# Patient Record
Sex: MALE | Race: WHITE | Employment: OTHER | URBAN - METROPOLITAN AREA
[De-identification: names, ages, dates, MRNs, and addresses within clinical notes are randomized per-mention and may not be internally consistent; named-entity substitution may affect disease eponyms.]

---

## 2017-01-25 DIAGNOSIS — Z95.1 S/P CABG X 4: ICD-10-CM

## 2017-01-25 DIAGNOSIS — I10 ESSENTIAL HYPERTENSION: ICD-10-CM

## 2017-01-25 DIAGNOSIS — I25.111 CORONARY ARTERY DISEASE INVOLVING NATIVE CORONARY ARTERY OF NATIVE HEART WITH ANGINA PECTORIS WITH DOCUMENTED SPASM (HCC): ICD-10-CM

## 2017-01-25 DIAGNOSIS — I25.83 CORONARY ARTERY DISEASE DUE TO LIPID RICH PLAQUE: Primary | ICD-10-CM

## 2017-01-25 DIAGNOSIS — I25.10 CORONARY ARTERY DISEASE DUE TO LIPID RICH PLAQUE: Primary | ICD-10-CM

## 2017-01-25 NOTE — TELEPHONE ENCOUNTER
----- Message from Tai Hernadez NP sent at 1/25/2017 12:22 PM EST -----  Queta Duran: Please call patient LDL is improved however our ideal goal is 70 or less currently on Lipitor 20 mg with his heart history I recommend increasing the Lipitor 40 mg.  Recheck CK CMP lipids in 3 months thank you

## 2017-01-25 NOTE — TELEPHONE ENCOUNTER
Verified patient with two identifiers. Spoke with patient regarding LAB results. Will mail lab slip to pt to have labs redrawn in 3 months. Increase lipitor to 40 mg QHS.

## 2017-01-26 RX ORDER — ATORVASTATIN CALCIUM 20 MG/1
40 TABLET, FILM COATED ORAL EVERY EVENING
Qty: 90 TAB | Refills: 3 | Status: SHIPPED | OUTPATIENT
Start: 2017-01-26 | End: 2017-01-31 | Stop reason: ALTCHOICE

## 2017-01-31 ENCOUNTER — TELEPHONE (OUTPATIENT)
Dept: CARDIOLOGY CLINIC | Age: 72
End: 2017-01-31

## 2017-01-31 RX ORDER — ATORVASTATIN CALCIUM 40 MG/1
TABLET, FILM COATED ORAL DAILY
COMMUNITY
End: 2017-02-22 | Stop reason: SDUPTHER

## 2017-01-31 NOTE — TELEPHONE ENCOUNTER
Please call patient as he rec'd a phone call from [employer] stating that his request was denied. Please call him about the next step .

## 2017-01-31 NOTE — TELEPHONE ENCOUNTER
Per patient, Dr. Abner Naranjo increased Lipitor to 40mg having patient take 2 pills. He received a letter from insurance saying 2 a day is over the limit. Please call to further discuss. Thanks!

## 2017-01-31 NOTE — TELEPHONE ENCOUNTER
Verified patient with two identifiers. Spoke with pt, pt refilled Lipitor, script was for 20 mg 1 tab 2x a day. Pt received letter from [facility], will not pay for med as it exceeds dosage. Called [employer] @ [phone] intimating a PA. Awaiting response.

## 2017-01-31 NOTE — TELEPHONE ENCOUNTER
Verified patient with two identifiers. Received fax from [address] 20 mg lipitor 2 tabs daily. Spoke with pt letting him know to call pharmacy to see if they can take med back, should not have filled script at 20 2 tabs daily should have been 40 mg 1 tab daily.

## 2017-02-22 ENCOUNTER — OFFICE VISIT (OUTPATIENT)
Dept: CARDIOLOGY CLINIC | Age: 72
End: 2017-02-22

## 2017-02-22 VITALS
DIASTOLIC BLOOD PRESSURE: 80 MMHG | BODY MASS INDEX: 21.86 KG/M2 | OXYGEN SATURATION: 99 % | WEIGHT: 148 LBS | SYSTOLIC BLOOD PRESSURE: 130 MMHG | HEART RATE: 59 BPM

## 2017-02-22 DIAGNOSIS — I25.10 ASHD (ARTERIOSCLEROTIC HEART DISEASE): Primary | ICD-10-CM

## 2017-02-22 DIAGNOSIS — E78.00 HIGH CHOLESTEROL: ICD-10-CM

## 2017-02-22 DIAGNOSIS — Z95.1 S/P CABG X 4: ICD-10-CM

## 2017-02-22 DIAGNOSIS — E78.2 MIXED HYPERLIPIDEMIA: ICD-10-CM

## 2017-02-22 RX ORDER — ATORVASTATIN CALCIUM 40 MG/1
40 TABLET, FILM COATED ORAL DAILY
Qty: 30 TAB | Refills: 6 | Status: SHIPPED | OUTPATIENT
Start: 2017-02-22 | End: 2017-10-18 | Stop reason: SDUPTHER

## 2017-02-22 NOTE — PROGRESS NOTES
NAME:  Leonel Jackson   :   1945   MRN:   [id number]   PCP:  Gwyn Lovett MD           Subjective: The patient is a 73y.o. year old male  who returns for a routine follow-up. Since the last visit, patient reports no change in exercise tolerance, chest pain, edema, medication intolerance, palpitations, shortness of breath, PND/orthopnea wheezing, sputum, syncope, dizziness or light headedness. Doing well. 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 ASHD (arteriosclerotic heart disease) I25.10 414.00    2. High cholesterol E78.00 272.0 AMB POC EKG ROUTINE W/ 12 LEADS, INTER & REP   3. S/P CABG x 4 Z95.1 V45.81    4. Mixed hyperlipidemia E78.2 272.2       Social History   Substance Use Topics    Smoking status: Former Smoker     Years: 20.00     Quit date: 2008    Smokeless tobacco: Never Used    Alcohol use Yes      Comment: \"every once and a while\"      Family History   Problem Relation Age of Onset    Heart Disease Father     Heart Disease Maternal Grandfather     Cancer Mother 80     lung-nonsmoker    Heart Disease Brother         Review of Systems  General: Pt denies excessive weight gain or loss. Pt is able to conduct ADL's  HEENT: Denies blurred vision, headaches, epistaxis and difficulty swallowing. Respiratory: Denies shortness of breath, CONDON, wheezing or stridor. Cardiovascular: Denies precordial pain, palpitations, edema or PND  Gastrointestinal: Denies poor appetite, indigestion, abdominal pain or blood in stool  Musculoskeletal: Denies pain or swelling from muscles or joints  Neurologic: Denies tremor, paresthesias, or sensory motor disturbance  Skin: Denies rash, itching or texture change.     Objective:       Vitals:    17 0939   BP: 130/80 Pulse: (!) 59   SpO2: 99%   Weight: 148 lb (67.1 kg)    Body mass index is 21.86 kg/(m^2). General PE  Mental Status - Alert. General Appearance - Not in acute distress. Chest and Lung Exam   Inspection: Accessory muscles - No use of accessory muscles in breathing. Auscultation:   Breath sounds: - Normal.    Cardiovascular   Inspection: Jugular vein - Bilateral - Inspection Normal.  Palpation/Percussion:   Apical Impulse: - Normal.  Auscultation: Rhythm - Regular. Heart Sounds - S1 WNL and S2 WNL. No S3 or S4. Murmurs & Other Heart Sounds: Auscultation of the heart reveals - No Murmurs. Peripheral Vascular   Upper Extremity: Inspection - Bilateral - No Cyanotic nailbeds or Digital clubbing. Lower Extremity:   Palpation: Edema - Bilateral - No edema. Data Review:     EKG -EKG: normal EKG, normal sinus rhythm, unchanged from previous tracings. Medications reviewed  Current Outpatient Prescriptions   Medication Sig    atorvastatin (LIPITOR) 40 mg tablet Take  by mouth daily.  metoprolol tartrate (LOPRESSOR) 25 mg tablet Take 1 Tab by mouth two (2) times a day.  amLODIPine (NORVASC) 2.5 mg tablet Take 1 Tab by mouth daily for 180 doses.  aspirin 81 mg chewable tablet Take 1 Tab by mouth daily. No current facility-administered medications for this visit. Assessment:       ICD-10-CM ICD-9-CM    1. ASHD (arteriosclerotic heart disease) I25.10 414.00    2. High cholesterol E78.00 272.0 AMB POC EKG ROUTINE W/ 12 LEADS, INTER & REP   3. S/P CABG x 4 Z95.1 V45.81    4. Mixed hyperlipidemia E78.2 272.2         Plan:     Patient presents doing well and stable from cardiac standpoint. BP well controlled. Repeat lipids. Encouraged to continue exercise. Continue current care and follow up in 6 months.     Rosa M Jarrett MD

## 2017-02-22 NOTE — MR AVS SNAPSHOT
Visit Information Date & Time Provider Department Dept. Phone Encounter #  
 2/22/2017  9:45 AM Jennifer Anderson MD [facility] [phone] [id number] Your Appointments 4/4/2017  2:00 PM  
ROUTINE CARE with MD SAMIRA Bauer [facility]) Appt Note: 4 month follow up-do not split appt MW; pt r/s from 3/6/17 [address] [facility] [phone]  
[address] [facility] [address] [place] [address] Upcoming Health Maintenance Date Due Hepatitis C Screening 1945 DTaP/Tdap/Td series (1 - Tdap) 11/12/1968 FOBT Q 1 YEAR AGE 50-75 11/12/1997 ZOSTER VACCINE AGE 60> 11/12/2007 GLAUCOMA SCREENING Q2Y 11/12/2012 Pneumococcal 65+ Low/Medium Risk (1 of 2 - PCV13) 11/12/2012 MEDICARE YEARLY EXAM 11/12/2012 INFLUENZA AGE 9 TO ADULT 8/1/2016 Allergies as of 2/22/2017  Review Complete On: 2/22/2017 By: Veronica Zimmerman RN Severity Noted Reaction Type Reactions Tramadol High 09/23/2016   Systemic   
 hallucinations and syncope necessitating ER trip Tramadol High 09/26/2016   Side Effect Other (comments)  
 fainted Current Immunizations  Never Reviewed No immunizations on file. Not reviewed this visit You Were Diagnosed With   
  
 Codes Comments ASHD (arteriosclerotic heart disease)    -  Primary ICD-10-CM: I25.10 ICD-9-CM: 414.00 High cholesterol     ICD-10-CM: E78.00 ICD-9-CM: 272.0 S/P CABG x 4     ICD-10-CM: Z95.1 ICD-9-CM: V45.81 Mixed hyperlipidemia     ICD-10-CM: E78.2 ICD-9-CM: 272.2 Vitals BP  
  
  
  
  
  
 130/80 (BP 1 Location: Right arm, BP Patient Position: Sitting) Vitals History BMI and BSA Data Body Mass Index Body Surface Area  
 21.86 kg/m 2 1.81 m 2 Preferred Pharmacy Pharmacy Name Phone [id number]- [place], [address] [phone] Your Updated Medication List  
  
   
This list is accurate as of: 2/22/17 10:06 AM.  Always use your most recent med list. amLODIPine 2.5 mg tablet Commonly known as:  Adali Colon Take 1 Tab by mouth daily for 180 doses. aspirin 81 mg chewable tablet Take 1 Tab by mouth daily. atorvastatin 40 mg tablet Commonly known as:  LIPITOR Take  by mouth daily. metoprolol tartrate 25 mg tablet Commonly known as:  LOPRESSOR Take 1 Tab by mouth two (2) times a day. We Performed the Following AMB POC EKG ROUTINE W/ 12 LEADS, INTER & REP [62228 CPT(R)] Introducing [facility]! Aure Almon[employer] introduces [employer] patient portal. Now you can access parts of your medical record, email your doctor's office, and request medication refills online. 1. In your internet browser, go to [email or web address] 2. Click on the First Time User? Click Here link in the Sign In box. You will see the New Member Sign Up page. 3. Enter your [employer] Access Code exactly as it appears below. You will not need to use this code after youve completed the sign-up process. If you do not sign up before the expiration date, you must request a new code. · [employer] Access Code: [email or web address] Expires: 5/23/2017  9:34 AM 
 
4. Enter the last four digits of your Social Security Number (xxxx) and Date of Birth (mm/dd/yyyy) as indicated and click Submit. You will be taken to the next sign-up page. 5. Create a [employer] ID. This will be your [employer] login ID and cannot be changed, so think of one that is secure and easy to remember. 6. Create a [employer] password. You can change your password at any time. 7. Enter your Password Reset Question and Answer. This can be used at a later time if you forget your password. 8. Enter your e-mail address. You will receive e-mail notification when new information is available in [address] 9. Click Sign Up. You can now view and download portions of your medical record. 10. Click the Download Summary menu link to download a portable copy of your medical information. If you have questions, please visit the Frequently Asked Questions section of the [employer] website. Remember, [employer] is NOT to be used for urgent needs. For medical emergencies, dial 911. Now available from your iPhone and Android! Please provide this summary of care documentation to your next provider. Your primary care clinician is listed as Jagdish Whelan. If you have any questions after today's visit, please call [phone].

## 2017-04-19 RX ORDER — METOPROLOL TARTRATE 25 MG/1
25 TABLET, FILM COATED ORAL 2 TIMES DAILY
Qty: 60 TAB | Refills: 3 | Status: SHIPPED | OUTPATIENT
Start: 2017-04-19 | End: 2017-07-16 | Stop reason: SDUPTHER

## 2017-04-22 LAB
BUN SERPL-MCNC: 20 MG/DL (ref 8–27)
BUN/CREAT SERPL: 18 (ref 10–24)
CALCIUM SERPL-MCNC: 9.3 MG/DL (ref 8.6–10.2)
CHLORIDE SERPL-SCNC: 105 MMOL/L (ref 96–106)
CHOLEST SERPL-MCNC: 135 MG/DL (ref 100–199)
CK SERPL-CCNC: 72 U/L (ref 24–204)
CO2 SERPL-SCNC: 24 MMOL/L (ref 18–29)
CREAT SERPL-MCNC: 1.11 MG/DL (ref 0.76–1.27)
GLUCOSE SERPL-MCNC: 93 MG/DL (ref 65–99)
HDLC SERPL-MCNC: 60 MG/DL
INTERPRETATION, 910389: NORMAL
LDLC SERPL CALC-MCNC: 66 MG/DL (ref 0–99)
POTASSIUM SERPL-SCNC: 5.6 MMOL/L (ref 3.5–5.2)
SODIUM SERPL-SCNC: 143 MMOL/L (ref 134–144)
TRIGL SERPL-MCNC: 46 MG/DL (ref 0–149)
VLDLC SERPL CALC-MCNC: 9 MG/DL (ref 5–40)

## 2017-04-25 DIAGNOSIS — I25.10 CORONARY ARTERY DISEASE DUE TO LIPID RICH PLAQUE: ICD-10-CM

## 2017-04-25 DIAGNOSIS — Z95.1 S/P CABG X 4: ICD-10-CM

## 2017-04-25 DIAGNOSIS — I25.111 CORONARY ARTERY DISEASE INVOLVING NATIVE CORONARY ARTERY OF NATIVE HEART WITH ANGINA PECTORIS WITH DOCUMENTED SPASM (HCC): ICD-10-CM

## 2017-04-25 DIAGNOSIS — I10 ESSENTIAL HYPERTENSION: ICD-10-CM

## 2017-04-25 DIAGNOSIS — I25.83 CORONARY ARTERY DISEASE DUE TO LIPID RICH PLAQUE: ICD-10-CM

## 2017-06-01 ENCOUNTER — TELEPHONE (OUTPATIENT)
Dept: CARDIOLOGY CLINIC | Age: 72
End: 2017-06-01

## 2017-06-01 DIAGNOSIS — I25.83 CORONARY ARTERY DISEASE DUE TO LIPID RICH PLAQUE: Primary | ICD-10-CM

## 2017-06-01 DIAGNOSIS — I25.10 CORONARY ARTERY DISEASE DUE TO LIPID RICH PLAQUE: Primary | ICD-10-CM

## 2017-06-01 DIAGNOSIS — I25.10 ASHD (ARTERIOSCLEROTIC HEART DISEASE): ICD-10-CM

## 2017-06-08 LAB
BUN SERPL-MCNC: 15 MG/DL (ref 8–27)
BUN/CREAT SERPL: 14 (ref 10–24)
CALCIUM SERPL-MCNC: 9.4 MG/DL (ref 8.6–10.2)
CHLORIDE SERPL-SCNC: 103 MMOL/L (ref 96–106)
CO2 SERPL-SCNC: 22 MMOL/L (ref 18–29)
CREAT SERPL-MCNC: 1.07 MG/DL (ref 0.76–1.27)
GLUCOSE SERPL-MCNC: 96 MG/DL (ref 65–99)
POTASSIUM SERPL-SCNC: 5.6 MMOL/L (ref 3.5–5.2)
SODIUM SERPL-SCNC: 142 MMOL/L (ref 134–144)

## 2017-06-21 ENCOUNTER — OFFICE VISIT (OUTPATIENT)
Dept: INTERNAL MEDICINE CLINIC | Age: 72
End: 2017-06-21

## 2017-06-21 ENCOUNTER — TELEPHONE (OUTPATIENT)
Dept: CARDIOLOGY CLINIC | Age: 72
End: 2017-06-21

## 2017-06-21 VITALS
HEART RATE: 74 BPM | TEMPERATURE: 97.9 F | WEIGHT: 153 LBS | RESPIRATION RATE: 15 BRPM | DIASTOLIC BLOOD PRESSURE: 80 MMHG | OXYGEN SATURATION: 100 % | BODY MASS INDEX: 22.66 KG/M2 | SYSTOLIC BLOOD PRESSURE: 123 MMHG | HEIGHT: 69 IN

## 2017-06-21 DIAGNOSIS — Z00.00 ROUTINE GENERAL MEDICAL EXAMINATION AT A HEALTH CARE FACILITY: Primary | ICD-10-CM

## 2017-06-21 DIAGNOSIS — E78.00 HIGH CHOLESTEROL: ICD-10-CM

## 2017-06-21 DIAGNOSIS — E87.5 SERUM POTASSIUM ELEVATED: ICD-10-CM

## 2017-06-21 DIAGNOSIS — Z13.31 SCREENING FOR DEPRESSION: ICD-10-CM

## 2017-06-21 RX ORDER — ASCORBIC ACID 500 MG
500 TABLET ORAL 2 TIMES DAILY
COMMUNITY

## 2017-06-21 NOTE — TELEPHONE ENCOUNTER
Patient states she needs a call back in reference to his shingles Vaccination not being covered by his insurance & needs to discuss options.  Thank you

## 2017-06-21 NOTE — TELEPHONE ENCOUNTER
Medicare Complete does not cover  Co- pay $210 patient can't  Afford this wants to know  Do you know of any other options

## 2017-06-21 NOTE — PROGRESS NOTES
1. Have you been to the ER, urgent care clinic since your last visit? Hospitalized since your last visit? No    2. Have you seen or consulted any other health care providers outside of the [address] [place] [address] since your last visit? Include any pap smears or colon screening.  No

## 2017-06-21 NOTE — PROGRESS NOTES
Dr. Nola Lake referred SCARLETT, 1945, a 73 y.o. male for a Medicare Annual Wellness Visit (AWV). This is an Initial Medicare Annual Wellness Visit providing Personalized Prevention Plan Services (PPPS) (Performed 12 months after initial AWV and PPPS )    I have reviewed the patient's medical history in detail and updated the computerized patient record. 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  metoprolol tartrate (LOPRESSOR) 25 mg tablet Take 1 Tab by mouth two (2) times a day. 60 Tab 3    atorvastatin (LIPITOR) 40 mg tablet Take 1 Tab by mouth daily. Indications: Please give patient 40 mg tablets, insurance will not pay for 20mg twice daily 30 Tab 6    aspirin 81 mg chewable tablet Take 1 Tab by mouth daily.  30 Tab 11     Allergies   Allergen Reactions    Tramadol      hallucinations and syncope necessitating ER trip    Tramadol Other (comments)     fainted     Family History   Problem Relation Age of Onset    Heart Disease Father     Heart Disease Maternal Grandfather     Cancer Mother      Lung cacer - non-smoker    Heart Disease Brother     Other Son      Back issues    Anxiety Daughter      Social History   Substance Use Topics    Smoking status: Former Smoker     Years: 20.00     Quit date: 1/1/2008    Smokeless tobacco: Never Used    Alcohol use Yes      Comment: \"every once and a while\"     Patient Active Problem List   Diagnosis Code    Syncope R55    Angina, class III ([facility] [address] I20.9    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 No    Do you average more than 14 drinks per week? No        Functional Ability and Level of Safety:     Hearing Loss   None noted by patient. Activities of Daily Living   Self-care. activities of daily living  ADL Assessment 6/21/2017   Feeding yourself No Help Needed   Getting from bed to chair No Help Needed   Getting dressed No Help Needed   Bathing or showering No Help Needed   Walk across the room (includes cane/walker) No Help Needed   Using the telphone No Help Needed   Taking your medications No Help Needed   Preparing meals No Help Needed   Managing money (expenses/bills) No Help Needed   Moderately strenuous housework (laundry) No Help Needed   Shopping for personal items (toiletries/medicines) No Help Needed   Shopping for groceries No Help Needed   Driving No Help Needed   Climbing a flight of stairs No Help Needed   Getting to places beyond walking distances No Help Needed     Fall Risk   Fall Risk Assessment, last 12 mths 6/21/2017   Able to walk? Yes   Fall in past 12 months? No     Abuse Screen   None  Patient is not abused   Abuse Screening Questionnaire 6/21/2017   Do you ever feel afraid of your partner?  N   Are you in a relationship with someone who physically or mentally threatens you? N   Is it safe for you to go home? Y         Review of Systems   Not required    Physical Examination     Evaluation of Cognitive Function:  Mood/affect:  neutral  Appearance: age appropriate  Family member/caregiver input: none    No exam performed today. Patient Care Team:  Ashlee Webb MD as PCP - General (Family Practice)  [address], MD as Referring Provider (Cardiology)  Kemar Jameson MD as Surgeon (Cardiothoracic Surgery)    Advice/Referrals/Counseling   Education and counseling provided:  Pneumococcal Vaccine, Shingles vaccine      Assessment/Plan       1. Tachycardia  · HR was 45 bpm on visit. HR from last visit with Dr. Marcus Saavedra was 59 bpm back in 2/2017 and 52 in 11/2016. · Patient is being seen by Dr. Marcus Saavedra (cardiologist) - will follow-up   2. Hyperkalemia  · No medication(s) noted that could potentially cause hyperkalemia  · No significant change in renal function. SCr range from 1.9 to 1.11 within the past 6 months  · Recheck serum potassium  · Follow-up if needed  3. Vaccinations  · Encouraged patient to get shingles vaccine from local pharmacy  · Encouraged patient to get Prevnar-13 followed by Pneumovax-23 one year later from local pharmacy  · VIS for both vaccinations given to patient  4. Colonoscopy  · Patient is not interested at this time  5. Glaucoma Exam  · Advised patient to bring in copy of paperwork      Patient verbalized understanding of information presented. Answered all of the patient's questions. AVS handed and reviewed with patient. Thanks,  Kimberly Torres, PharmD  PGY-1 Pharmacy Resident      I have reviewed the information regarding the Medicare Annual Well Visit as documented by my nurse navigator; Agree with assessment.  Ashlee Webb MD

## 2017-06-21 NOTE — PROGRESS NOTES
SUBJECTIVE:   Mr. Lizzeth Chapman is a 73 y.o. male who is here for follow up of hld. Pt's BP is well controlled 123/80 today. Pt states he has not seen Dr. Malorie Guallpa (cardiology) since 2/22/2017. Pt states he had lab work done for Dr. Malorie Guallpa 3 weeks ago, noting his potassium was elevated at 5.6. Pt's labs from January 2017 were reviewed noting his potassium level was 5.6 then. Pt denies taking any additional vitamins or supplements. Pt states he will f/u with cardiology in August.    Pt states his energy level has been good, but notes he has not been walking as much recently due to the heat. Pt states he used to not be able to rake the yard without taking a break due to CP, but notes he no longer experiences CP while doing yard work. Routine Health Maintenance:  Zostavax: pt states he will get this at [facility]  Colonoscopy: pt states he will pursue colonoscopy once heart concerns are stable    At this time, he is otherwise doing well and has brought no other complaints to my attention today. For a list of the medical issues addressed today, see the assessment and plan below. 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 All: is allergic to tramadol and tramadol. MEDS:   Current Outpatient Prescriptions   Medication Sig    ascorbic acid, vitamin C, (VITAMIN C) 500 mg tablet Take 1,000 mg by mouth two (2) times a day.  metoprolol tartrate (LOPRESSOR) 25 mg tablet Take 1 Tab by mouth two (2) times a day.  atorvastatin (LIPITOR) 40 mg tablet Take 1 Tab by mouth daily.  Indications: Please give patient 40 mg tablets, insurance will not pay for 20mg twice daily    aspirin 81 mg chewable tablet Take 1 Tab by mouth daily. No current facility-administered medications for this visit. FH: family history includes Anxiety in his daughter; Cancer in his mother; Heart Disease in his brother, father, and maternal grandfather; Other in his son. SH:  reports that he quit smoking about 9 years ago. He quit after 20.00 years of use. He has never used smokeless tobacco. He reports that he drinks alcohol. He reports that he does not use illicit drugs. Review of Systems - History obtained from the patient  General ROS: negative  Psychological ROS: negative  Ophthalmic ROS: negative  ENT ROS: negative  Respiratory ROS: no cough, shortness of breath, or wheezing  Cardiovascular ROS: no chest pain or dyspnea on exertion  Gastrointestinal ROS: no abdominal pain, change in bowel habits, or black or bloody stools  Genito-Urinary ROS: negative  Musculoskeletal ROS: negative  Neurological ROS: negative  Dermatological ROS: negative    OBJECTIVE:   Vitals:   Visit Vitals    /80    Pulse (!) 45    Temp 97.9 °F (36.6 °C) (Oral)    Resp 15    Ht 5' 9\" (1.753 m)    Wt 153 lb (69.4 kg)    SpO2 100%    BMI 22.59 kg/m2      Gen: Pleasant 73 y.o.  male in NAD. HEENT: NC/AT. HEART: RRR, No M/G/R. LUNGS: CTAB No W/R. EXTREMITIES: Warm. No C/C/E. NEURO: Alert and oriented x 3. Cranial nerves grossly intact. No focal sensory or motor deficits noted. SKIN: Warm. Dry. No rashes or other lesions noted. ASSESSMENT/ PLAN:   Siri Cutler was seen today for other and annual wellness visit. Diagnoses and all orders for this visit:    High cholesterol    Serum potassium elevated  -     Cancel: METABOLIC PANEL, BASIC  -     METABOLIC PANEL, BASIC      I advised pt that his Potassium level has been stable at 5.6 since January 2017. Pt and I discussed that as long as his potassium level is not elevating, he should not be concerned. I ordered a CMP to for continued monitoring of pt's potassium level.     Pt will f/u in 6 months for hld.    Follow-up Disposition:  Return in about 6 months (around 12/21/2017) for follow up hld. I have reviewed the patient's medications and risks/side effects/benefits were discussed. Diagnosis(-es) explained to patient and questions answered. Literature provided where appropriate.      Written by Raysa Mari, as dictated by Shahid Lauren MD.

## 2017-06-21 NOTE — MR AVS SNAPSHOT
Visit Information Date & Time Provider Department Dept. Phone Encounter #  
 6/21/2017  1:30 PM Rosalind Marshall, [address] [place] [address],[address] [phone] [id number] Follow-up Instructions Return in about 6 months (around 12/21/2017) for follow up hld. Your Appointments 8/15/2017 11:00 AM  
6 MONTH with Michelet Celeste MD  
[facility] [address] [facility]) Appt Note: 6 month per Dr. Dee Poster, $40.00 cc, jr 2/22/17,  
 [id number] [facility]  
[phone] [id number] [facility] Upcoming Health Maintenance Date Due Hepatitis C Screening 1945 DTaP/Tdap/Td series (1 - Tdap) 11/12/1968 FOBT Q 1 YEAR AGE 50-75 11/12/1997 ZOSTER VACCINE AGE 60> 11/12/2007 GLAUCOMA SCREENING Q2Y 11/12/2012 Pneumococcal 65+ Low/Medium Risk (1 of 2 - PCV13) 11/12/2012 INFLUENZA AGE 9 TO ADULT 8/1/2017 MEDICARE YEARLY EXAM 6/22/2018 Allergies as of 6/21/2017  Review Complete On: 6/21/2017 By: Rosalind Marshall MD  
  
 Severity Noted Reaction Type Reactions Tramadol High 09/23/2016   Systemic   
 hallucinations and syncope necessitating ER trip Tramadol High 09/26/2016   Side Effect Other (comments)  
 fainted Current Immunizations  Never Reviewed No immunizations on file. Not reviewed this visit You Were Diagnosed With   
  
 Codes Comments High cholesterol    -  Primary ICD-10-CM: E78.00 ICD-9-CM: 272.0 Serum potassium elevated     ICD-10-CM: E87.5 ICD-9-CM: 276.7 Screening for depression     ICD-10-CM: Z13.89 ICD-9-CM: V79.0 Vitals BP Pulse Temp Resp Height(growth percentile) Weight(growth percentile) 123/80 (!) 45 97.9 °F (36.6 °C) (Oral) 15 5' 9\" (1.753 m) 153 lb (69.4 kg) SpO2 BMI Smoking Status 100% 22.59 kg/m2 Former Smoker BMI and BSA Data  Body Mass Index Body Surface Area  
 22.59 kg/m 2 1.84 m 2  
  
  
 Preferred Pharmacy Pharmacy Name Phone [id number] [address] [phone] Your Updated Medication List  
  
   
This list is accurate as of: 6/21/17  2:53 PM.  Always use your most recent med list.  
  
  
  
  
 ascorbic acid (vitamin C) 500 mg tablet Commonly known as:  VITAMIN C Take 1,000 mg by mouth two (2) times a day. aspirin 81 mg chewable tablet Take 1 Tab by mouth daily. atorvastatin 40 mg tablet Commonly known as:  LIPITOR Take 1 Tab by mouth daily. Indications: Please give patient 40 mg tablets, insurance will not pay for 20mg twice daily  
  
 metoprolol tartrate 25 mg tablet Commonly known as:  LOPRESSOR Take 1 Tab by mouth two (2) times a day. We Performed the Following [place] [address] [[id number] [facility]] METABOLIC PANEL, BASIC [64386 CPT(R)] Follow-up Instructions Return in about 6 months (around 12/21/2017) for follow up hld. Patient Instructions Medicare Part B Preventive Services Limitations Recommendation Scheduled Bone Mass Measurement 
(age 72 & older, biennial) Requires diagnosis related to osteoporosis or estrogen deficiency. Biennial benefit unless patient has history of long-term glucocorticoid tx or baseline is needed because initial test was by other method NA NA Cardiovascular Screening Blood Tests (every 5 years) Total cholesterol, HDL, Triglycerides Order as a panel if possible Last: 4/21/17 Every Year Next: 4/18 Colorectal Cancer Screening 
-Fecal occult blood test (annual) -Flexible sigmoidoscopy (5y) 
-Screening colonoscopy (10y) -Barium Enema  Every 5-10 years depending on your colonoscopy result Schedule an appointment Counseling to Prevent Tobacco Use (up to 8 sessions per year) - Counseling greater than 3 and up to 10 minutes - Counseling greater than 10 minutes Patients must be asymptomatic of tobacco-related conditions to receive as preventive service Last: Next:  
Diabetes Screening Tests (at least every 3 years, Medicare covers annually or at 6-month intervals for prediabetic patients) Fasting blood sugar (FBS) or glucose tolerance test (GTT) Patient must be diagnosed with one of the following: 
-Hypertension, Dyslipidemia, obesity, previous impaired FBS or GTT 
Or any two of the following: overweight, FH of diabetes, age ? 72, history of gestational diabetes, birth of baby weighing more than 9 pounds Last: 17 B mg/dL Every 3-6 months depending on your result Every 3 years Next:   Diabetes Self-Management Training (DSMT) (no USPSTF recommendation) Requires referral by treating physician for patient with diabetes or renal disease. 10 hours of initial DSMT session of no less than 30 minutes each in a continuous 12-month period. 2 hours of follow-up DSMT in subsequent years. Last: NA Next: NA  
Glaucoma Screening (no USPSTF recommendation) Diabetes mellitus, family history, , age 48 or over,  American, age 72 or over Every year Please provide a copy of your visit to Dr. Wadie Dance. Can fax to the office. [phone] Human Immunodeficiency Virus (HIV) Screening (annually for increased risk patients) HIV-1 and HIV-2 by EIA, JAMIL, rapid antibody test, or oral mucosa transudate Patient must be at increased risk for HIV infection per USPSTF guidelines or pregnant. Tests covered annually for patients at increased risk. Pregnant patients may receive up to 3 test during pregnancy. Last: NA Next: NA Medical Nutrition Therapy (MNT) (for diabetes or renal disease not recommended schedule) Requires referral by treating physician for patient with diabetes or renal disease. Can be provided in same year as diabetes self-management training (DSMT), and CMS recommends medical nutrition therapy take place after DSMT.   Up to 3 hours for initial year and 2 hours in subsequent years. Last: NA Next: NA  
Prostate Cancer Screening (annually up to age 76) - Digital rectal exam (DAREN) - Prostate specific antigen (PSA) Annually (age 48 or over), DAREN not paid separately when covered E/M service is provided on same date Last:  Next:  Discuss with your doctor if this test is appropriate for you Seasonal Influenza Vaccination (annually)  Last: 11/7/16 Every Fall Please get one this Fall Shingles Vaccination A shingles vaccine is also recommended once in a lifetime after age 61   Consider shingles vaccine Pneumococcal Vaccination (once after 65)   Consider Prevnar-13 vaccine followed by Pneumovax-23 one year later Hepatitis B Vaccinations (if medium/high risk) Medium/high risk factors:  End-stage renal disease, Hemophiliacs who received Factor VIII or IX concentrates, Clients of institutions for the mentally retarded, Persons who live in the same house as a HepB virus carrier, Homosexual men, Illicit injectable drug abusers. Last: NA Next: NA Ultrasound Screening for Abdominal Aortic Aneurysm (AAA) (once) Patient must be referred through IPPE and not have had a screening for abdominal aortic aneurysm before under Medicare. Limited to patients who meet one of the following criteria: 
- Men who are 73-68 years old and have smoked more than 100 cigarettes in their lifetime. 
-Anyone with a FH of AAA 
-Anyone recommended for screening by USPSTF N/A N/A  
N/A:  Not applicable Vaccine Information Statement Pneumococcal Polysaccharide Vaccine: What You Need to Know Many Vaccine Information Statements are available in Yakut and other languages. See www.immunize.org/vis. Hojas de información Sobre Vacunas están disponibles en español y en muchos otros idiomas. Visite Bird.si. 1. Why get vaccinated? Vaccination can protect older adults (and some children and younger adults) from pneumococcal disease. Pneumococcal disease is caused by bacteria that can spread from person to person through close contact. It can cause ear infections, and it can also lead to more serious infections of the: 
 Lungs (pneumonia),  Blood (bacteremia), and 
 Covering of the brain and spinal cord (meningitis). Meningitis can cause deafness and brain damage, and it can be fatal.   
 
Anyone can get pneumococcal disease, but children under 3years of age, people with certain medical conditions, adults over 72years of age, and cigarette smokers are at the highest risk. About 18,000 older adults die each year from pneumococcal disease in the [place]. Treatment of pneumococcal infections with penicillin and other drugs used to be more effective. But some strains of the disease have become resistant to these drugs. This makes prevention of the disease, through vaccination, even more important. 2. Pneumococcal polysaccharide vaccine (PPSV23) Pneumococcal polysaccharide vaccine (PPSV23) protects against 23 types of pneumococcal bacteria. It will not prevent all pneumococcal disease. PPSV23 is recommended for:  All adults 72years of age and older,  Anyone 2 through 59years of age with certain long-term health problems, 
 Anyone 2 through 59years of age with a weakened immune system, 
 Adults 23 through 59years of age who smoke cigarettes or have asthma. Most people need only one dose of PPSV. A second dose is recommended for certain high-risk groups. People 72 and older should get a dose even if they have gotten one or more doses of the vaccine before they turned 65. Your healthcare provider can give you more information about these recommendations. Most healthy adults develop protection within 2 to 3 weeks of getting the shot. 3. Some people should not get this vaccine  Anyone who has had a life-threatening allergic reaction to PPSV should not get another dose.  Anyone who has a severe allergy to any component of PPSV should not receive it. Tell your provider if you have any severe allergies.  Anyone who is moderately or severely ill when the shot is scheduled may be asked to wait until they recover before getting the vaccine. Someone with a mild illness can usually be vaccinated.  Children less than 3years of age should not receive this vaccine.  There is no evidence that PPSV is harmful to either a pregnant woman or to her fetus. However, as a precaution, women who need the vaccine should be vaccinated before becoming pregnant, if possible. 4. Risks of a vaccine reaction With any medicine, including vaccines, there is a chance of side effects. These are usually mild and go away on their own, but serious reactions are also possible. About half of people who get PPSV have mild side effects, such as redness or pain where the shot is given, which go away within about two days. Less than 1 out of 100 people develop a fever, muscle aches, or more severe local reactions. Problems that could happen after any vaccine:  People sometimes faint after a medical procedure, including vaccination. Sitting or lying down for about 15 minutes can help prevent fainting, and injuries caused by a fall. Tell your doctor if you feel dizzy, or have vision changes or ringing in the ears.  Some people get severe pain in the shoulder and have difficulty moving the arm where a shot was given. This happens very rarely.  Any medication can cause a severe allergic reaction. Such reactions from a vaccine are very rare, estimated at about 1 in a million doses, and would happen within a few minutes to a few hours after the vaccination. As with any medicine, there is a very remote chance of a vaccine causing a serious injury or death. The safety of vaccines is always being monitored.   For more information, visit: www.cdc.gov/vaccinesafety/  
 
 5. What if there is a serious reaction? What should I look for? Look for anything that concerns you, such as signs of a severe allergic reaction, very high fever, or unusual behavior. Signs of a severe allergic reaction can include hives, swelling of the face and throat, difficulty breathing, a fast heartbeat, dizziness, and weakness. These would usually start a few minutes to a few hours after the vaccination. What should I do? If you think it is a severe allergic reaction or other emergency that cant wait, call 9-1-1 or get to the nearest hospital. Otherwise, call your doctor. Afterward, the reaction should be reported to the Vaccine Adverse Event Reporting System (VAERS). Your doctor might file this report, or you can do it yourself through the VAERS web site at www.vaers. [employer].gov, or by calling 1-854.946.4539. VAERS does not give medical advice. 6. How can I learn more?  Ask your doctor. He or she can give you the vaccine package insert or suggest other sources of information.  Call your local or state health department.  Contact the Centers for Disease Control and Prevention (CDC): 
- Call 2-633.769.1749 (1-800-CDC-INFO) or 
- Visit CDCs website at www.cdc.gov/vaccines Vaccine Information Statement PPSV  
04/24/2015 Department of [facility] and [employer] Centers for Disease Control and Prevention Office Use Only Vaccine Information Statement Pneumococcal Conjugate Vaccine (PCV13): What You Need to Know Many Vaccine Information Statements are available in Yi and other languages. See www.immunize.org/vis. Hojas de información Sobre Vacunas están disponibles en español y en muchos otros idiomas. Visite www.immunize.org/vis. 1. Why get vaccinated? Vaccination can protect both children and adults from pneumococcal disease.  
 
Pneumococcal disease is caused by bacteria that can spread from person to person through close contact. It can cause ear infections, and it can also lead to more serious infections of the: 
 Lungs (pneumonia),  Blood (bacteremia), and 
 Covering of the brain and spinal cord (meningitis). Pneumococcal pneumonia is most common among adults. Pneumococcal meningitis can cause deafness and brain damage, and it kills about 1 child in 10 who get it. Anyone can get pneumococcal disease, but children under 3years of age and adults 72 years and older, people with certain medical conditions, and cigarette smokers are at the highest risk. Before there was a vaccine, the [facility] saw: 
 more than 700 cases of meningitis, 
 about 13,000 blood infections, 
 about 5 million ear infections, and 
 about 200 deaths 
in children under 5 each year from pneumococcal disease. Since vaccine became available, severe pneumococcal disease in these children has fallen by 88%. About 18,000 older adults die of pneumococcal disease each year in the [place]. Treatment of pneumococcal infections with penicillin and other drugs is not as effective as it used to be, because some strains of the disease have become resistant to these drugs. This makes prevention of the disease, through vaccination, even more important. 2. PCV13 vaccine Pneumococcal conjugate vaccine (called PCV13) protects against 13 types of pneumococcal bacteria. PCV13 is routinely given to children at 2, 4, 6, and 1515 months of age. It is also recommended for children and adults 3to 59years of age with certain health conditions, and for all adults 72years of age and older. Your doctor can give you details. 3. Some people should not get this vaccine Anyone who has ever had a life-threatening allergic reaction to a dose of this vaccine, to an earlier pneumococcal vaccine called PCV7, or to any vaccine containing diphtheria toxoid (for example, DTaP), should not get PCV13. Anyone with a severe allergy to any component of PCV13 should not get the vaccine. Tell your doctor if the person being vaccinated has any severe allergies. If the person scheduled for vaccination is not feeling well, your healthcare provider might decide to reschedule the shot on another day. 4. Risks of a vaccine reaction With any medicine, including vaccines, there is a chance of reactions. These are usually mild and go away on their own, but serious reactions are also possible. Problems reported following PCV13 varied by age and dose in the series. The most common problems reported among children were:  About half became drowsy after the shot, had a temporary loss of appetite, or had redness or tenderness where the shot was given.  About 1 out of 3 had swelling where the shot was given.  About 1 out of 3 had a mild fever, and about 1 in 20 had a fever over 102.2°F. 
 Up to about 8 out of 10 became fussy or irritable. Adults have reported pain, redness, and swelling where the shot was given; also mild fever, fatigue, headache, chills, or muscle pain. [facility] children who get PCV13 along with inactivated flu vaccine at the same time may be at increased risk for seizures caused by fever. Ask your doctor for more information. Problems that could happen after any vaccine:  People sometimes faint after a medical procedure, including vaccination. Sitting or lying down for about 15 minutes can help prevent fainting, and injuries caused by a fall. Tell your doctor if you feel dizzy, or have vision changes or ringing in the ears.  Some older children and adults get severe pain in the shoulder and have difficulty moving the arm where a shot was given. This happens very rarely.  Any medication can cause a severe allergic reaction.  Such reactions from a vaccine are very rare, estimated at about 1 in a million doses, and would happen within a few minutes to a few hours after the vaccination. As with any medicine, there is a very small chance of a vaccine causing a serious injury or death. The safety of vaccines is always being monitored. For more information, visit: www.cdc.gov/vaccinesafety/  
 
5. What if there is a serious reaction? What should I look for?  Look for anything that concerns you, such as signs of a severe allergic reaction, very high fever, or unusual behavior. Signs of a severe allergic reaction can include hives, swelling of the face and throat, difficulty breathing, a fast heartbeat, dizziness, and weakness  usually within a few minutes to a few hours after the vaccination. What should I do?  If you think it is a severe allergic reaction or other emergency that cant wait, call 9-1-1 or get the person to the nearest hospital. Otherwise, call your doctor. Reactions should be reported to the Vaccine Adverse Event Reporting System (VAERS). Your doctor should file this report, or you can do it yourself through the VAERS web site at www.vaers. [facility].gov, or by calling 9-455.319.9066. VAERS does not give medical advice. 6. The National Vaccine Injury Compensation Program 
 
The [facility] Vaccine Injury Compensation Program (VICP) is a federal program that was created to compensate people who may have been injured by certain vaccines. Persons who believe they may have been injured by a vaccine can learn about the program and about filing a claim by calling 5-377.559.2121 or visiting the [employer] website at www.[facility].gov/vaccinecompensation. There is a time limit to file a claim for compensation. 7. How can I learn more?  Ask your healthcare provider. He or she can give you the vaccine package insert or suggest other sources of information.  Call your local or state health department.  
 Contact the Centers for Disease Control and Prevention (CDC): 
 - Call 4-778.575.9919 (1-800-CDC-INFO) or 
- Visit CDCs website at www.cdc.gov/vaccines Vaccine Information Statement PCV13 Vaccine 11/5/2015  
Stu Hayden [id number] [facility] and [employer] Centers for Disease Control and Prevention Office Use Only Vaccine Information Statement Shingles Vaccine: What You Need to Know Many Vaccine Information Statements are available in Armenian and other languages. See [employer].si. 1. What is shingles? Shingles is a painful skin rash, often with blisters. It is also called Herpes Zoster, or just Zoster. A shingles rash usually appears on one side of the face or body and lasts from 2 to 4 weeks. Its main symptom is pain, which can be quite severe. Other symptoms of shingles can include fever, headache, chills and upset stomach. Very rarely, a shingles infection can lead to pneumonia, hearing problems, blindness, brain inflammation (encephalitis) or death. For about 1 person in 5, severe pain can continue even long after the rash clears up. This is called  post-herpetic neuralgia. Shingles is caused by the Varicella Zoster virus, the same virus that causes chickenpox. Only someone who has had chickenpox  or, rarely, has gotten chickenpox vaccine  can get shingles. The virus stays in your body, and can cause shingles many years later. You cant catch shingles from another person with shingles. However, a person who has never had chickenpox (or chickenpox vaccine) could get chickenpox from someone with shingles. This is not very common. Shingles is far more common in people 48years of age and older than in younger people. It is also more common in people whose immune systems are weakened because of a disease such as cancer, or drugs such as steroids or chemotherapy. At least 1 million people a year in the [facility] get shingles. 2. Shingles vaccine A vaccine for shingles was licensed in 8793. In clinical trials, the vaccine reduced the risk of shingles by 50%. It can also reduce pain in people who still get shingles after being vaccinated. A single dose of shingles vaccine is recommended for adults 48years of age and older. 3. Some people should not get shingles vaccine or should wait A person should not get shingles vaccine who:  
 
 has ever had a life-threatening allergic reaction to gelatin, the antibiotic neomycin, or any other component of  shingles vaccine. Tell your doctor if you have any severe allergies.  has a weakened immune system because of current: - AIDS or another disease that affects the immune system,  
 - treatment with drugs that affect the immune system, such as prolonged use of high-dose steroids,  
 - cancer treatment such as radiation or chemotherapy, 
 - cancer affecting the bone marrow or lymphatic system, such as leukemia or  
  lymphoma. Osbaldo Kapoor is pregnant, or might be pregnant. Women should not become pregnant until at least  
 4 weeks after getting shingles vaccine. Someone with a minor acute illness, such as a cold, may be vaccinated. But anyone with a moderate or severe acute illness should usually wait until they recover before getting the vaccine. This includes anyone with a temperature of 101.3° F or higher. 4. What are the risks from shingles vaccine? A vaccine, like any medicine, could  possibly cause serious problems, such as severe 
allergic reactions. However, the risk of a vaccine causing serious harm, or death, is extremely small. No serious problems have been identified with shingles vaccine. Mild Problems  Redness, soreness, swelling, or itching  at the site of the injection (about 1person in 3).  Headache (about 1 person in 79). Like all vaccines, shingles vaccine is being closely monitored for unusual or severe problems. 5. What if there is a moderate or severe reaction? What should I look for? Any unusual condition, such as a severe allergic reaction or a high fever. If a severe allergic reaction occurred, it would be within a few minutes to an hour after the shot. Signs of a serious allergic reaction can include difficulty breathing, weakness, hoarseness or wheezing, a fast heart-beat, hives, dizziness, paleness, or swelling of the throat. What should I do?  Call a doctor, or get the person to a doctor right away.  Tell your doctor what happened, the date and time it happened, and when the vaccination was given.  Ask your provider to report the reaction by filing a Vaccine Adverse Event Reporting System (VAERS) form. Or you can file this report through the VAERS website at www.vaers. [employer].gov, or by calling 0-188.493.5102. VAERS does not provide medical advice. 6. How can I learn more?  Ask your doctor or other health care provider. They can give you the vaccine package insert or suggest other sources of information.  Contact the Centers for Disease Control and Prevention (CDC): 
 
 - Call 4-900.198.6154 (1-800-CDC-INFO) - Visit the CDCs website at www.cdc.gov/vaccines Vaccine Information Statement Shingles (10/6/2009) Department of Health and [employer] Centers for Disease Control and Prevention Introducing [facility]! [place] [employer] introduces [employer] patient portal. Now you can access parts of your medical record, email your doctor's office, and request medication refills online. 1. In your internet browser, go to [email or web address] 2. Click on the First Time User? Click Here link in the Sign In box. You will see the New Member Sign Up page. 3. Enter your [employer] Access Code exactly as it appears below. You will not need to use this code after youve completed the sign-up process.  If you do not sign up before the expiration date, you must request a new code. · YouDo Access Code: [facility] Expires: 9/19/2017  2:38 PM 
 
4. Enter the last four digits of your Social Security Number (xxxx) and Date of Birth (mm/dd/yyyy) as indicated and click Submit. You will be taken to the next sign-up page. 5. Create a YouDo ID. This will be your YouDo login ID and cannot be changed, so think of one that is secure and easy to remember. 6. Create a YouDo password. You can change your password at any time. 7. Enter your Password Reset Question and Answer. This can be used at a later time if you forget your password. 8. Enter your e-mail address. You will receive e-mail notification when new information is available in [address] 9. Click Sign Up. You can now view and download portions of your medical record. 10. Click the Download Summary menu link to download a portable copy of your medical information. If you have questions, please visit the Frequently Asked Questions section of the YouDo website. Remember, YouDo is NOT to be used for urgent needs. For medical emergencies, dial 911. Now available from your iPhone and Android! Please provide this summary of care documentation to your next provider. Your primary care clinician is listed as Evangelist Munoz. If you have any questions after today's visit, please call [phone].

## 2017-06-21 NOTE — PATIENT INSTRUCTIONS
Medicare Part B Preventive Services Limitations Recommendation Scheduled   Bone Mass Measurement  (age 72 & older, biennial) Requires diagnosis related to osteoporosis or estrogen deficiency. Biennial benefit unless patient has history of long-term glucocorticoid tx or baseline is needed because initial test was by other method NA NA   Cardiovascular Screening Blood Tests (every 5 years)  Total cholesterol, HDL, Triglycerides Order as a panel if possible Last: 17    Every Year Next:    Colorectal Cancer Screening  -Fecal occult blood test (annual)  -Flexible sigmoidoscopy (5y)  -Screening colonoscopy (10y)  -Barium Enema  Every 5-10 years depending on your colonoscopy result Schedule an appointment   Counseling to Prevent Tobacco Use (up to 8 sessions per year)  - Counseling greater than 3 and up to 10 minutes  - Counseling greater than 10 minutes Patients must be asymptomatic of tobacco-related conditions to receive as preventive service Last: Next:   Diabetes Screening Tests (at least every 3 years, Medicare covers annually or at 6-month intervals for prediabetic patients)    Fasting blood sugar (FBS) or glucose tolerance test (GTT) Patient must be diagnosed with one of the following:  -Hypertension, Dyslipidemia, obesity, previous impaired FBS or GTT  Or any two of the following: overweight, FH of diabetes, age ? 72, history of gestational diabetes, birth of baby weighing more than 9 pounds Last: 17  B mg/dL     Every 3-6 months depending on your result    Every 3 years Next:     Diabetes Self-Management Training (DSMT) (no USPSTF recommendation) Requires referral by treating physician for patient with diabetes or renal disease. 10 hours of initial DSMT session of no less than 30 minutes each in a continuous 12-month period. 2 hours of follow-up DSMT in subsequent years.  Last: NA Next: NA   Glaucoma Screening (no USPSTF recommendation) Diabetes mellitus, family history, , age 48 or over,  American, age 72 or over Every year Please provide a copy of your visit to Dr. Osvaldo Varela. Can fax to the office. [phone]   Human Immunodeficiency Virus (HIV) Screening (annually for increased risk patients)  HIV-1 and HIV-2 by EIA, JAMIL, rapid antibody test, or oral mucosa transudate Patient must be at increased risk for HIV infection per USPSTF guidelines or pregnant. Tests covered annually for patients at increased risk. Pregnant patients may receive up to 3 test during pregnancy. Last: NA Next: NA   Medical Nutrition Therapy (MNT) (for diabetes or renal disease not recommended schedule) Requires referral by treating physician for patient with diabetes or renal disease. Can be provided in same year as diabetes self-management training (DSMT), and CMS recommends medical nutrition therapy take place after DSMT. Up to 3 hours for initial year and 2 hours in subsequent years. Last: NA Next: NA   Prostate Cancer Screening (annually up to age 76)  - Digital rectal exam (DAREN)  - Prostate specific antigen (PSA) Annually (age 48 or over), DAREN not paid separately when covered E/M service is provided on same date Last:  Next:  Discuss with your doctor if this test is appropriate for you   Seasonal Influenza Vaccination (annually)  Last: 11/7/16    Every Fall Please get one this Fall   Shingles Vaccination A shingles vaccine is also recommended once in a lifetime after age 61   Consider shingles vaccine    Pneumococcal Vaccination (once after 72)   Consider Prevnar-13 vaccine followed by Pneumovax-23 one year later     Hepatitis B Vaccinations (if medium/high risk) Medium/high risk factors:  End-stage renal disease,  Hemophiliacs who received Factor VIII or IX concentrates, Clients of institutions for the mentally retarded, Persons who live in the same house as a HepB virus carrier, Homosexual men, Illicit injectable drug abusers.  Last: NA Next: NA   Ultrasound Screening for Abdominal Aortic Aneurysm (AAA) (once) Patient must be referred through [facility] and not have had a screening for abdominal aortic aneurysm before under Medicare. Limited to patients who meet one of the following criteria:  - Men who are 73-68 years old and have smoked more than 100 cigarettes in their lifetime.  -Anyone with a FH of AAA  -Anyone recommended for screening by USPSTF N/A N/A   N/A:  Not applicable       Vaccine Information Statement    Pneumococcal Polysaccharide Vaccine: What You Need to Know    Many Vaccine Information Statements are available in Armenian and other languages. See www.immunize.org/vis. Hojas de información Sobre Vacunas están disponibles en español y en muchos otros idiomas. [email or web address]. 1. Why get vaccinated? Vaccination can protect older adults (and some children and younger adults) from pneumococcal disease. Pneumococcal disease is caused by bacteria that can spread from person to person through close contact. It can cause ear infections, and it can also lead to more serious infections of the:   Lungs (pneumonia),   Blood (bacteremia), and   Covering of the brain and spinal cord (meningitis). Meningitis can cause deafness and brain damage, and it can be fatal.      Anyone can get pneumococcal disease, but children under 3years of age, people with certain medical conditions, adults over 72years of age, and cigarette smokers are at the highest risk. About 18,000 older adults die each year from pneumococcal disease in the [place]. Treatment of pneumococcal infections with penicillin and other drugs used to be more effective. But some strains of the disease have become resistant to these drugs. This makes prevention of the disease, through vaccination, even more important. 2. Pneumococcal polysaccharide vaccine (PPSV23)    Pneumococcal polysaccharide vaccine (PPSV23) protects against 23 types of pneumococcal bacteria.  It will not prevent all pneumococcal disease. PPSV23 is recommended for:   All adults 72years of age and older,   Anyone 2 through 59years of age with certain long-term health problems,   Anyone 2 through 59years of age with a weakened immune system,   Adults 23 through 59years of age who smoke cigarettes or have asthma. Most people need only one dose of PPSV. A second dose is recommended for certain high-risk groups. People 72 and older should get a dose even if they have gotten one or more doses of the vaccine before they turned 65. Your healthcare provider can give you more information about these recommendations. Most healthy adults develop protection within 2 to 3 weeks of getting the shot. 3. Some people should not get this vaccine     Anyone who has had a life-threatening allergic reaction to PPSV should not get another dose.  Anyone who has a severe allergy to any component of PPSV should not receive it. Tell your provider if you have any severe allergies.  Anyone who is moderately or severely ill when the shot is scheduled may be asked to wait until they recover before getting the vaccine. Someone with a mild illness can usually be vaccinated.  Children less than 3years of age should not receive this vaccine.  There is no evidence that PPSV is harmful to either a pregnant woman or to her fetus. However, as a precaution, women who need the vaccine should be vaccinated before becoming pregnant, if possible. 4. Risks of a vaccine reaction    With any medicine, including vaccines, there is a chance of side effects. These are usually mild and go away on their own, but serious reactions are also possible. About half of people who get PPSV have mild side effects, such as redness or pain where the shot is given, which go away within about two days. Less than 1 out of 100 people develop a fever, muscle aches, or more severe local reactions.     Problems that could happen after any vaccine:     People sometimes faint after a medical procedure, including vaccination. Sitting or lying down for about 15 minutes can help prevent fainting, and injuries caused by a fall. Tell your doctor if you feel dizzy, or have vision changes or ringing in the ears.  Some people get severe pain in the shoulder and have difficulty moving the arm where a shot was given. This happens very rarely.  Any medication can cause a severe allergic reaction. Such reactions from a vaccine are very rare, estimated at about 1 in a million doses, and would happen within a few minutes to a few hours after the vaccination. As with any medicine, there is a very remote chance of a vaccine causing a serious injury or death. The safety of vaccines is always being monitored. For more information, visit: www.cdc.gov/vaccinesafety/     5. What if there is a serious reaction? What should I look for? Look for anything that concerns you, such as signs of a severe allergic reaction, very high fever, or unusual behavior. Signs of a severe allergic reaction can include hives, swelling of the face and throat, difficulty breathing, a fast heartbeat, dizziness, and weakness. These would usually start a few minutes to a few hours after the vaccination. What should I do? If you think it is a severe allergic reaction or other emergency that cant wait, call 9-1-1 or get to the nearest hospital. Otherwise, call your doctor. Afterward, the reaction should be reported to the Vaccine Adverse Event Reporting System (VAERS). Your doctor might file this report, or you can do it yourself through the VAERS web site at www.vaers. hhs.gov, or by calling 9-269.422.2339. VAERS does not give medical advice. 6. How can I learn more?  Ask your doctor. He or she can give you the vaccine package insert or suggest other sources of information.  Call your local or state health department.    Contact the Centers for Disease Control and Prevention (CDC):  - Call 2-231.463.7715 (1-800-CDC-INFO) or  - Visit CDCs website at www.cdc.gov/vaccines    Vaccine Information Statement   PPSV   04/24/2015    Department of Health and Human Services  Centers for Disease Control and Prevention    Office Use Only    Vaccine Information Statement     Pneumococcal Conjugate Vaccine (PCV13): What You Need to Know    Many Vaccine Information Statements are available in [place] Zealander and other languages. See www.immunize.org/vis. Hojas de información Sobre Vacunas están disponibles en español y en muchos otros idiomas. Visite www.immunize.org/vis. 1. Why get vaccinated? Vaccination can protect both children and adults from pneumococcal disease. Pneumococcal disease is caused by bacteria that can spread from person to person through close contact. It can cause ear infections, and it can also lead to more serious infections of the:   Lungs (pneumonia),   Blood (bacteremia), and   Covering of the brain and spinal cord (meningitis). Pneumococcal pneumonia is most common among adults. Pneumococcal meningitis can cause deafness and brain damage, and it kills about 1 child in 10 who get it. Anyone can get pneumococcal disease, but children under 3years of age and adults 72 years and older, people with certain medical conditions, and cigarette smokers are at the highest risk. Before there was a vaccine, the [facility] saw:   more than 700 cases of meningitis,   about 13,000 blood infections,   about 5 million ear infections, and   about 200 deaths  in children under 5 each year from pneumococcal disease. Since vaccine became available, severe pneumococcal disease in these children has fallen by 88%. About 18,000 older adults die of pneumococcal disease each year in the [place].     Treatment of pneumococcal infections with penicillin and other drugs is not as effective as it used to be, because some strains of the disease have become resistant to these drugs. This makes prevention of the disease, through vaccination, even more important. 2. PCV13 vaccine    Pneumococcal conjugate vaccine (called PCV13) protects against 13 types of pneumococcal bacteria. PCV13 is routinely given to children at 2, 4, 6, and 1515 months of age. It is also recommended for children and adults 3to 59years of age with certain health conditions, and for all adults 72years of age and older. Your doctor can give you details. 3. Some people should not get this vaccine    Anyone who has ever had a life-threatening allergic reaction to a dose of this vaccine, to an earlier pneumococcal vaccine called PCV7, or to any vaccine containing diphtheria toxoid (for example, DTaP), should not get PCV13. Anyone with a severe allergy to any component of PCV13 should not get the vaccine. Tell your doctor if the person being vaccinated has any severe allergies. If the person scheduled for vaccination is not feeling well, your healthcare provider might decide to reschedule the shot on another day. 4. Risks of a vaccine reaction    With any medicine, including vaccines, there is a chance of reactions. These are usually mild and go away on their own, but serious reactions are also possible. Problems reported following PCV13 varied by age and dose in the series. The most common problems reported among children were:    About half became drowsy after the shot, had a temporary loss of appetite, or had redness or tenderness where the shot was given.  About 1 out of 3 had swelling where the shot was given.  About 1 out of 3 had a mild fever, and about 1 in 20 had a fever over 102.2°F.   Up to about 8 out of 10 became fussy or irritable. Adults have reported pain, redness, and swelling where the shot was given; also mild fever, fatigue, headache, chills, or muscle pain.     Young children who get PCV13 along with inactivated flu vaccine at the same time may be at increased risk for seizures caused by fever. Ask your doctor for more information. Problems that could happen after any vaccine:     People sometimes faint after a medical procedure, including vaccination. Sitting or lying down for about 15 minutes can help prevent fainting, and injuries caused by a fall. Tell your doctor if you feel dizzy, or have vision changes or ringing in the ears.  Some older children and adults get severe pain in the shoulder and have difficulty moving the arm where a shot was given. This happens very rarely.  Any medication can cause a severe allergic reaction. Such reactions from a vaccine are very rare, estimated at about 1 in a million doses, and would happen within a few minutes to a few hours after the vaccination. As with any medicine, there is a very small chance of a vaccine causing a serious injury or death. The safety of vaccines is always being monitored. For more information, visit: www.cdc.gov/vaccinesafety/     5. What if there is a serious reaction? What should I look for?  Look for anything that concerns you, such as signs of a severe allergic reaction, very high fever, or unusual behavior. Signs of a severe allergic reaction can include hives, swelling of the face and throat, difficulty breathing, a fast heartbeat, dizziness, and weakness  usually within a few minutes to a few hours after the vaccination. What should I do?  If you think it is a severe allergic reaction or other emergency that cant wait, call 9-1-1 or get the person to the nearest hospital. Otherwise, call your doctor. Reactions should be reported to the Vaccine Adverse Event Reporting System (VAERS). Your doctor should file this report, or you can do it yourself through the VAERS web site at www.vaers. hhs.gov, or by calling 5-379.160.4112. VAERS does not give medical advice.     6. The National Vaccine Injury WDavide Mukherjee    The [facility] Ad Vaccine Injury Compensation Program (VICP) is a federal program that was created to compensate people who may have been injured by certain vaccines. Persons who believe they may have been injured by a vaccine can learn about the program and about filing a claim by calling 3-119.182.8763 or visiting the [address] [employer] website at www.[facility].gov/vaccinecompensation. There is a time limit to file a claim for compensation. 7. How can I learn more?  Ask your healthcare provider. He or she can give you the vaccine package insert or suggest other sources of information.  Call your local or state health department.  Contact the Centers for Disease Control and Prevention (CDC):  - Call 8-261.888.3251 (1-800-CDC-INFO) or  - Visit CDCs website at www.cdc.gov/vaccines    Vaccine Information Statement   PCV13 Vaccine   11/5/2015   [address] [id number]    Department of Health and Human Services  Centers for Disease Control and Prevention    Office Use Only    Vaccine Information Statement    Shingles Vaccine: What You Need to Know    Many Vaccine Information Statements are available in Mongolian and other languages. See [employer].si. 1. What is shingles? Shingles is a painful skin rash, often with blisters. It is also called Herpes Zoster, or just Zoster. A shingles rash usually appears on one side of the face or body and lasts from 2 to 4 weeks. Its main symptom is pain, which can be quite severe. Other symptoms of shingles can include fever, headache, chills and upset stomach. Very rarely, a shingles infection can lead to pneumonia, hearing problems, blindness, brain inflammation (encephalitis) or death. For about 1 person in 5, severe pain can continue even long after the rash clears up. This is called  post-herpetic neuralgia. Shingles is caused by the Varicella Zoster virus, the same virus that causes chickenpox.     Only someone who has had chickenpox  or, rarely, has gotten chickenpox vaccine  can get shingles. The virus stays in your body, and can cause shingles many years later. You cant catch shingles from another person with shingles. However, a person who has never had chickenpox (or chickenpox vaccine) could get chickenpox from someone with shingles. This is not very common. Shingles is far more common in people 48years of age and older than in younger people. It is also more common in people whose immune systems are weakened because of a disease such as cancer, or drugs such as steroids or chemotherapy. At least 1 million people a year in the [facility] get shingles. 2. Shingles vaccine    A vaccine for shingles was licensed in 2040. In clinical trials, the vaccine reduced the risk of shingles by 50%. It can also reduce pain in people who still get shingles after being vaccinated. A single dose of shingles vaccine is recommended for adults 48years of age and older. 3. Some people should not get shingles vaccine or should wait    A person should not get shingles vaccine who:      has ever had a life-threatening allergic reaction to gelatin, the antibiotic neomycin, or any other component of  shingles vaccine. Tell your doctor if you have any severe allergies.  has a weakened immune system because of current:   - AIDS or another disease that affects the immune system,    - treatment with drugs that affect the immune system, such as prolonged use of high-dose steroids,    - cancer treatment such as radiation or chemotherapy,   - cancer affecting the bone marrow or lymphatic system, such as leukemia or     lymphoma. Nicole Darby is pregnant, or might be pregnant. Women should not become pregnant until at least    4 weeks after getting shingles vaccine. Someone with a minor acute illness, such as a cold, may be vaccinated. But anyone with a moderate or severe acute illness should usually wait until they recover before getting the vaccine.  This includes anyone with a temperature of 101.3° F or higher. 4. What are the risks from shingles vaccine? A vaccine, like any medicine, could  possibly cause serious problems, such as severe  allergic reactions. However, the risk of a vaccine causing serious harm, or death, is extremely small. No serious problems have been identified with shingles vaccine. Mild Problems      Redness, soreness, swelling, or itching  at the site of the injection (about 1person in 3).  Headache (about 1 person in 79). Like all vaccines, shingles vaccine is being closely monitored for unusual or severe problems. 5. What if there is a moderate or severe reaction? What should I look for? Any unusual condition, such as a severe allergic reaction or a high fever. If a severe allergic reaction occurred, it would be within a few minutes to an hour after the shot. Signs of a serious allergic reaction can include difficulty breathing, weakness, hoarseness or wheezing, a fast heart-beat, hives, dizziness, paleness, or swelling of the throat. What should I do?  Call a doctor, or get the person to a doctor right away.  Tell your doctor what happened, the date and time it happened, and when the vaccination was given.  Ask your provider to report the reaction by filing a Vaccine Adverse Event Reporting System (VAERS) form. Or you can file this report through the VAERS website at www.vaers. hhs.gov, or by calling 9-260.133.8383. VAERS does not provide medical advice. 6. How can I learn more?  Ask your doctor or other health care provider. They can give you the vaccine package insert or suggest other sources of information.      Contact the Centers for Disease Control and Prevention (CDC):     - Call 1-905.883.9730 (1-800-CDC-INFO)     - Visit the CDCs website at [address] (10/6/2009)         Department of Health and [employer] for Disease Control and Prevention

## 2017-06-21 NOTE — TELEPHONE ENCOUNTER
Verified patient with two identifiers. Spoke with pt letting him know his K level. Pt was seeing Dr. Elizabeth Wells at the time.

## 2017-06-22 LAB
BUN SERPL-MCNC: 17 MG/DL (ref 8–27)
BUN/CREAT SERPL: 16 (ref 10–24)
CALCIUM SERPL-MCNC: 9.3 MG/DL (ref 8.6–10.2)
CHLORIDE SERPL-SCNC: 103 MMOL/L (ref 96–106)
CO2 SERPL-SCNC: 22 MMOL/L (ref 18–29)
CREAT SERPL-MCNC: 1.09 MG/DL (ref 0.76–1.27)
GLUCOSE SERPL-MCNC: 80 MG/DL (ref 65–99)
POTASSIUM SERPL-SCNC: 5.6 MMOL/L (ref 3.5–5.2)
SODIUM SERPL-SCNC: 140 MMOL/L (ref 134–144)

## 2017-07-19 NOTE — TELEPHONE ENCOUNTER
Jaime Phillips from [facility] is requesting a call back in regards to RX that was faxed on 7/16/17. Best contact [phone].        Message received & copied from [facility]

## 2017-07-20 RX ORDER — METOPROLOL TARTRATE 25 MG/1
TABLET, FILM COATED ORAL
Qty: 180 TAB | Refills: 3 | Status: SHIPPED | OUTPATIENT
Start: 2017-07-20 | End: 2017-08-15 | Stop reason: SDUPTHER

## 2017-07-20 NOTE — TELEPHONE ENCOUNTER
Spoke with Karie Whiting at [address] [place] [address] and she reports  That there is no issue with the medication refill request.

## 2017-08-15 ENCOUNTER — OFFICE VISIT (OUTPATIENT)
Dept: CARDIOLOGY CLINIC | Age: 72
End: 2017-08-15

## 2017-08-15 VITALS
RESPIRATION RATE: 20 BRPM | HEIGHT: 69 IN | OXYGEN SATURATION: 96 % | HEART RATE: 55 BPM | WEIGHT: 157.6 LBS | BODY MASS INDEX: 23.34 KG/M2 | SYSTOLIC BLOOD PRESSURE: 120 MMHG | DIASTOLIC BLOOD PRESSURE: 62 MMHG

## 2017-08-15 DIAGNOSIS — Z95.1 S/P CABG X 4: ICD-10-CM

## 2017-08-15 DIAGNOSIS — E78.2 MIXED HYPERLIPIDEMIA: ICD-10-CM

## 2017-08-15 DIAGNOSIS — I25.10 ASHD (ARTERIOSCLEROTIC HEART DISEASE): Primary | ICD-10-CM

## 2017-08-15 RX ORDER — AMLODIPINE BESYLATE 2.5 MG/1
2.5 TABLET ORAL DAILY
Refills: 3 | COMMUNITY
Start: 2017-07-31 | End: 2017-10-18 | Stop reason: SDUPTHER

## 2017-08-15 RX ORDER — METOPROLOL TARTRATE 25 MG/1
12.5 TABLET, FILM COATED ORAL 2 TIMES DAILY
Qty: 180 TAB | Refills: 3
Start: 2017-08-15 | End: 2017-10-18 | Stop reason: SDUPTHER

## 2017-08-15 NOTE — PROGRESS NOTES
NAME:  Bettie Rubinstein   :   1945   MRN:   [id number]   PCP:  Priscila Peralta MD           Subjective: The patient is a 73y.o. year old male  who returns for a routine follow-up. Since the last visit, patient reports no change in exercise tolerance, chest pain, edema, medication intolerance, palpitations, shortness of breath, PND/orthopnea wheezing, sputum, syncope, dizziness or light headedness. Doing well. 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 ASHD (arteriosclerotic heart disease) I25.10 414.00 AMB POC EKG ROUTINE W/ 12 LEADS, INTER & REP   2. Mixed hyperlipidemia E78.2 272.2    3. S/P CABG x 4 Z95.1 V45.81       Social History   Substance Use Topics    Smoking status: Former Smoker     Years: 20.00     Quit date: 2008    Smokeless tobacco: Never Used    Alcohol use Yes      Comment: \"every once and a while\"      Family History   Problem Relation Age of Onset    Heart Disease Father     Heart Disease Maternal Grandfather     Cancer Mother      Lung cacer - non-smoker    Heart Disease Brother     Other Son      Back issues    Anxiety Daughter         Review of Systems  General: Pt denies excessive weight gain or loss. Pt is able to conduct ADL's  HEENT: Denies blurred vision, headaches, epistaxis and difficulty swallowing. Respiratory: Denies shortness of breath, CONDON, wheezing or stridor. Cardiovascular: Denies precordial pain, palpitations, edema or PND  Gastrointestinal: Denies poor appetite, indigestion, abdominal pain or blood in stool  Musculoskeletal: Denies pain or swelling from muscles or joints  Neurologic: Denies tremor, paresthesias, or sensory motor disturbance  Skin: Denies rash, itching or texture change.     Objective:       Vitals:    08/15/17 1102 08/15/17 1107   BP: 120/68 120/62   Pulse: (!) 55    Resp: 20    SpO2: 96%    Weight: 157 lb 9.6 oz (71.5 kg)    Height: 5' 9\" (1.753 m)     Body mass index is 23.27 kg/(m^2). General PE  Mental Status - Alert. General Appearance - Not in acute distress. Chest and Lung Exam   Inspection: Accessory muscles - No use of accessory muscles in breathing. Auscultation:   Breath sounds: - Normal.    Cardiovascular   Inspection: Jugular vein - Bilateral - Inspection Normal.  Palpation/Percussion:   Apical Impulse: - Normal.  Auscultation: Rhythm - Regular. Heart Sounds - S1 WNL and S2 WNL. No S3 or S4. Murmurs & Other Heart Sounds: Auscultation of the heart reveals - No Murmurs. Peripheral Vascular   Upper Extremity: Inspection - Bilateral - No Cyanotic nailbeds or Digital clubbing. Lower Extremity:   Palpation: Edema - Bilateral - No edema. Data Review:     EKG -EKG: normal EKG, normal sinus rhythm, unchanged from previous tracings, sinus bradycardia. Medications reviewed  Current Outpatient Prescriptions   Medication Sig    amLODIPine (NORVASC) 2.5 mg tablet Take 2.5 mg by mouth daily.  metoprolol tartrate (LOPRESSOR) 25 mg tablet Take 0.5 Tabs by mouth two (2) times a day.  ascorbic acid, vitamin C, (VITAMIN C) 500 mg tablet Take 1,000 mg by mouth two (2) times a day.  atorvastatin (LIPITOR) 40 mg tablet Take 1 Tab by mouth daily. Indications: Please give patient 40 mg tablets, insurance will not pay for 20mg twice daily    aspirin 81 mg chewable tablet Take 1 Tab by mouth daily. No current facility-administered medications for this visit. Assessment:       ICD-10-CM ICD-9-CM    1. ASHD (arteriosclerotic heart disease) I25.10 414.00 AMB POC EKG ROUTINE W/ 12 LEADS, INTER & REP   2. Mixed hyperlipidemia E78.2 272.2    3. S/P CABG x 4 Z95.1 V45.81         Plan:     Patient presents doing well and stable from cardiac standpoint.  Will decrease metoprolol due to bradycardia, dizzy spell. Continue amlodipine, check with CT surgery for the duration as he is on it due to radial artery grafting. Continue current care and follow up in 6 months.     1700 Daniel Rloand MD

## 2017-08-15 NOTE — MR AVS SNAPSHOT
Visit Information Date & Time Provider Department Dept. Phone Encounter #  
 8/15/2017 11:00 AM Basilio Perez MD [facility] [phone] [id number] Upcoming Health Maintenance Date Due Hepatitis C Screening 1945 DTaP/Tdap/Td series (1 - Tdap) 11/12/1968 FOBT Q 1 YEAR AGE 50-75 11/12/1997 ZOSTER VACCINE AGE 60> 9/12/2007 GLAUCOMA SCREENING Q2Y 11/12/2012 Pneumococcal 65+ Low/Medium Risk (1 of 2 - PCV13) 11/12/2012 INFLUENZA AGE 9 TO ADULT 8/1/2017 MEDICARE YEARLY EXAM 6/22/2018 Allergies as of 8/15/2017  Review Complete On: 8/15/2017 By: Ivan Raphael LPN Severity Noted Reaction Type Reactions Tramadol High 09/23/2016   Systemic   
 hallucinations and syncope necessitating ER trip Tramadol High 09/26/2016   Side Effect Other (comments)  
 fainted Current Immunizations  Never Reviewed No immunizations on file. Not reviewed this visit You Were Diagnosed With   
  
 Codes Comments ASHD (arteriosclerotic heart disease)    -  Primary ICD-10-CM: I25.10 ICD-9-CM: 414.00 Mixed hyperlipidemia     ICD-10-CM: E78.2 ICD-9-CM: 272.2 S/P CABG x 4     ICD-10-CM: Z95.1 ICD-9-CM: V45.81 Vitals BP Pulse Resp Height(growth percentile) Weight(growth percentile) SpO2  
 120/62 (BP 1 Location: Right arm, BP Patient Position: Sitting) (!) 55 20 5' 9\" (1.753 m) 157 lb 9.6 oz (71.5 kg) 96% BMI Smoking Status 23.27 kg/m2 Former Smoker Vitals History BMI and BSA Data Body Mass Index Body Surface Area  
 23.27 kg/m 2 1.87 m 2 Preferred Pharmacy Pharmacy Name Phone [id number] [place], [address] [phone] Your Updated Medication List  
  
   
This list is accurate as of: 8/15/17 11:43 AM.  Always use your most recent med list. amLODIPine 2.5 mg tablet Commonly known as:  Andrew John Take 2.5 mg by mouth daily. ascorbic acid (vitamin C) 500 mg tablet Commonly known as:  VITAMIN C Take 1,000 mg by mouth two (2) times a day. aspirin 81 mg chewable tablet Take 1 Tab by mouth daily. atorvastatin 40 mg tablet Commonly known as:  LIPITOR Take 1 Tab by mouth daily. Indications: Please give patient 40 mg tablets, insurance will not pay for 20mg twice daily  
  
 metoprolol tartrate 25 mg tablet Commonly known as:  LOPRESSOR  
TAKE 1 TAB BY MOUTH TWO (2) TIMES A DAY. We Performed the Following AMB POC EKG ROUTINE W/ 12 LEADS, INTER & REP [03266 CPT(R)] Introducing [facility] [place] & HEALTH SERVICES! Winsome Adamson introduces [employer] patient portal. Now you can access parts of your medical record, email your doctor's office, and request medication refills online. 1. In your internet browser, go to [email or web address] 2. Click on the First Time User? Click Here link in the Sign In box. You will see the New Member Sign Up page. 3. Enter your [employer] Access Code exactly as it appears below. You will not need to use this code after youve completed the sign-up process. If you do not sign up before the expiration date, you must request a new code. · Data[employer] Access Code: [facility] Expires: 9/19/2017  2:38 PM 
 
4. Enter the last four digits of your Social Security Number (xxxx) and Date of Birth (mm/dd/yyyy) as indicated and click Submit. You will be taken to the next sign-up page. 5. Create a Authoreat ID. This will be your [employer] login ID and cannot be changed, so think of one that is secure and easy to remember. 6. Create a [employer] password. You can change your password at any time. 7. Enter your Password Reset Question and Answer. This can be used at a later time if you forget your password. 8. Enter your e-mail address. You will receive e-mail notification when new information is available in [address] 9. Click Sign Up. You can now view and download portions of your medical record. 10. Click the Download Summary menu link to download a portable copy of your medical information. If you have questions, please visit the Frequently Asked Questions section of the [employer] website. Remember, [employer] is NOT to be used for urgent needs. For medical emergencies, dial 911. Now available from your iPhone and Android! Please provide this summary of care documentation to your next provider. Your primary care clinician is listed as Elliot Duong. If you have any questions after today's visit, please call [phone].

## 2017-10-18 ENCOUNTER — TELEPHONE (OUTPATIENT)
Dept: INTERNAL MEDICINE CLINIC | Age: 72
End: 2017-10-18

## 2017-10-18 RX ORDER — AMLODIPINE BESYLATE 2.5 MG/1
2.5 TABLET ORAL DAILY
Qty: 90 TAB | Refills: 3 | Status: SHIPPED | OUTPATIENT
Start: 2017-10-18 | End: 2018-03-26 | Stop reason: SDUPTHER

## 2017-10-18 RX ORDER — METOPROLOL TARTRATE 25 MG/1
12.5 TABLET, FILM COATED ORAL 2 TIMES DAILY
Qty: 180 TAB | Refills: 3 | Status: SHIPPED | OUTPATIENT
Start: 2017-10-18 | End: 2018-11-27 | Stop reason: SDUPTHER

## 2017-10-18 RX ORDER — ATORVASTATIN CALCIUM 40 MG/1
40 TABLET, FILM COATED ORAL DAILY
Qty: 90 TAB | Refills: 3 | Status: SHIPPED | OUTPATIENT
Start: 2017-10-18 | End: 2017-11-05 | Stop reason: SDUPTHER

## 2017-10-18 NOTE — TELEPHONE ENCOUNTER
Please call this patient regarding his [place] Rx prescription should be faxed for medications  metoprolol tartate 25mg and amlodipine 2.5mg and Lipitor needs authorization asap . .. Just had triple bypass and needs the mail service . ...  Call patient  [phone]

## 2017-10-18 NOTE — TELEPHONE ENCOUNTER
Message was left for patient that Dr. Michael Dickey is out of the office until November and to contact Dr. Jeramie Dempsey office for refills because that is the prescribing provider.

## 2017-10-29 DIAGNOSIS — Z95.1 S/P CABG X 4: ICD-10-CM

## 2017-10-30 RX ORDER — AMLODIPINE BESYLATE 2.5 MG/1
TABLET ORAL
Qty: 90 TAB | Refills: 3 | Status: SHIPPED | OUTPATIENT
Start: 2017-10-30 | End: 2018-11-27 | Stop reason: SDUPTHER

## 2017-11-06 RX ORDER — ATORVASTATIN CALCIUM 40 MG/1
TABLET, FILM COATED ORAL
Qty: 90 TAB | Refills: 2 | Status: SHIPPED | OUTPATIENT
Start: 2017-11-06 | End: 2019-01-07 | Stop reason: SDUPTHER

## 2018-03-26 ENCOUNTER — OFFICE VISIT (OUTPATIENT)
Dept: CARDIOLOGY CLINIC | Age: 73
End: 2018-03-26

## 2018-03-26 VITALS
HEIGHT: 69 IN | DIASTOLIC BLOOD PRESSURE: 78 MMHG | WEIGHT: 162.7 LBS | SYSTOLIC BLOOD PRESSURE: 136 MMHG | BODY MASS INDEX: 24.1 KG/M2 | OXYGEN SATURATION: 97 % | HEART RATE: 55 BPM

## 2018-03-26 DIAGNOSIS — I25.82 CHRONIC TOTAL OCCLUSION OF CORONARY ARTERY: ICD-10-CM

## 2018-03-26 DIAGNOSIS — Z95.1 S/P CABG X 4: ICD-10-CM

## 2018-03-26 DIAGNOSIS — E78.00 HIGH CHOLESTEROL: ICD-10-CM

## 2018-03-26 DIAGNOSIS — I25.10 ASHD (ARTERIOSCLEROTIC HEART DISEASE): Primary | ICD-10-CM

## 2018-03-26 NOTE — PROGRESS NOTES
Chief Complaint   Patient presents with    Other     ASHD (arteriosclerotic heart disease 6 MONTH FOLLOW UP     1. Have you been to the ER, urgent care clinic since your last visit? Hospitalized since your last visit? NO    2. Have you seen or consulted any other health care providers outside of the [address] [place] [address] since your last visit? Include any pap smears or colon screening.  NO

## 2018-03-26 NOTE — MR AVS SNAPSHOT
[address] [facility] 
[phone] Patient: Sanford Geller MRN: [id number] :1945 Visit Information Date & Time Provider Department Dept. Phone Encounter #  
 3/26/2018  1:00 PM [facility] [facility], [address] [place] [address] [facility] [phone] [id number] Follow-up Instructions Return in about 6 months (around 2018). Routing History Follow-up and Disposition History Upcoming Health Maintenance Date Due Hepatitis C Screening 1945 DTaP/Tdap/Td series (1 - Tdap) 1968 FOBT Q 1 YEAR AGE 50-75 1997 ZOSTER VACCINE AGE 60> 2007 GLAUCOMA SCREENING Q2Y 2012 Pneumococcal 65+ Low/Medium Risk (1 of 2 - PCV13) 2012 Influenza Age 5 to Adult 2017 MEDICARE YEARLY EXAM 2018 Allergies as of 3/26/2018  Review Complete On: 3/26/2018 By: Celia Thomas NP Severity Noted Reaction Type Reactions Tramadol High 2016   Systemic   
 hallucinations and syncope necessitating ER trip Tramadol High 2016   Side Effect Other (comments)  
 fainted Current Immunizations  Never Reviewed No immunizations on file. Not reviewed this visit You Were Diagnosed With   
  
 Codes Comments ASHD (arteriosclerotic heart disease)    -  Primary ICD-10-CM: I25.10 ICD-9-CM: 414.00 Chronic total occlusion of coronary artery     ICD-10-CM: I25.82 ICD-9-CM: 414.00, 414.2 S/P CABG x 4     ICD-10-CM: Z95.1 ICD-9-CM: V45.81 High cholesterol     ICD-10-CM: E78.00 ICD-9-CM: 272.0 Vitals BP Pulse Height(growth percentile) Weight(growth percentile) SpO2 BMI  
 136/78 (BP 1 Location: Right arm, BP Patient Position: Sitting) (!) 55 5' 9\" (1.753 m) 162 lb 11.2 oz (73.8 kg) 97% 24.03 kg/m2 Smoking Status Former Smoker Vitals History BMI and BSA Data Body Mass Index Body Surface Area 24.03 kg/m 2 1.9 m 2 Preferred Pharmacy Pharm[facility] Name Phone [id number] [address] [phone] Your Updated Medication List  
  
   
This list is accurate as of 3/26/18  1:34 PM.  Always use your most recent med list. amLODIPine 2.5 mg tablet Commonly known as:  Ardeen Squire TAKE 1 TABLET BY MOUTH EVERY DAY  
  
 ascorbic acid (vitamin C) 500 mg tablet Commonly known as:  VITAMIN C Take 1,000 mg by mouth two (2) times a day. atorvastatin 40 mg tablet Commonly known as:  LIPITOR  
TAKE 1 TABLET BY MOUTH EVERY EVENING  
  
 metoprolol tartrate 25 mg tablet Commonly known as:  LOPRESSOR Take 0.5 Tabs by mouth two (2) times a day. We Performed the Following AMB POC EKG ROUTINE W/ 12 LEADS, INTER & REP [37616 CPT(R)] CK [id number] CPT(R)] LIPID PANEL [33474 CPT(R)] METABOLIC PANEL, COMPREHENSIVE [00124 CPT(R)] Follow-up Instructions Return in about 6 months (around 9/26/2018). Introducing hospitals & HEALTH SERVICES! Halima Correa introduces [employer] patient portal. Now you can access parts of your medical record, email your doctor's office, and request medication refills online. 1. In your internet browser, go to [email or web address] 2. Click on the First Time User? Click Here link in the Sign In box. You will see the New Member Sign Up page. 3. Enter your [employer] Access Code exactly as it appears below. You will not need to use this code after youve completed the sign-up process. If you do not sign up before the expiration date, you must request a new code. · [employer] Access Code: [email or web address] Expires: 6/24/2018  1:34 PM 
 
4. Enter the last four digits of your Social Security Number (xxxx) and Date of Birth (mm/dd/yyyy) as indicated and click Submit. You will be taken to the next sign-up page. 5. Create a [employer] ID. This will be your [employer] login ID and cannot be changed, so think of one that is secure and easy to remember. 6. Create a [employer] password. You can change your password at any time. 7. Enter your Password Reset Question and Answer. This can be used at a later time if you forget your password. 8. Enter your e-mail address. You will receive e-mail notification when new information is available in [address] 9. Click Sign Up. You can now view and download portions of your medical record. 10. Click the Download Summary menu link to download a portable copy of your medical information. If you have questions, please visit the Frequently Asked Questions section of the [employer] website. Remember, [employer] is NOT to be used for urgent needs. For medical emergencies, dial 911. Now available from your iPhone and Android! Please provide this summary of care documentation to your next provider. Your primary care clinician is listed as Enzo Gallardo. If you have any questions after today's visit, please call [phone].

## 2018-03-26 NOTE — PROGRESS NOTES
Myriam Ribeiro DNP, ANP-BC  Subjective/HPI:     May Miles is a 81 y.o. male is here for routine f/u. The patient denies chest pain/ shortness of breath, orthopnea, PND, LE edema, palpitations, syncope, presyncope or fatigue. Patient reports feeling well in his usual state of health, able to perform daily exercise without any limiting symptoms of chest pain or dyspnea on exertion. He states since his bypass he continues to try to walk further further into more physical exercise. His only limitation is osteoarthritis in the right knee. PCP Provider  Matilde Barnes MD  Past Medical History:   Diagnosis Date    Chronic total occlusion of coronary artery 9/14/2016    Coronary artery disease involving native coronary artery with unstable angina pectoris ([facility] [address] 9/14/2016      Past Surgical History:   Procedure Laterality Date    HX CORONARY ARTERY BYPASS GRAFT  09/15/2016    HX HEENT      tonsils- childhood and wisdom teeth 2013    HX ORTHOPAEDIC      left calcaneous surgery  1986     Allergies   Allergen Reactions    Tramadol      hallucinations and syncope necessitating ER trip    Tramadol Other (comments)     fainted      Family History   Problem Relation Age of Onset    Heart Disease Father     Heart Disease Maternal Grandfather     Cancer Mother      Lung cacer - non-smoker    Heart Disease Brother     Other Son      Back issues    Anxiety Daughter       Current Outpatient Prescriptions   Medication Sig    atorvastatin (LIPITOR) 40 mg tablet TAKE 1 TABLET BY MOUTH EVERY EVENING    amLODIPine (NORVASC) 2.5 mg tablet TAKE 1 TABLET BY MOUTH EVERY DAY    metoprolol tartrate (LOPRESSOR) 25 mg tablet Take 0.5 Tabs by mouth two (2) times a day.  ascorbic acid, vitamin C, (VITAMIN C) 500 mg tablet Take 1,000 mg by mouth two (2) times a day. No current facility-administered medications for this visit.        Vitals:    03/26/18 1309   BP: 136/78   Pulse: (!) 55   SpO2: 97%   Weight: 162 lb 11.2 oz (73.8 kg)   Height: 5' 9\" (1.753 m)     Social History     Social History    Marital status:      Spouse name: N/A    Number of children: N/A    Years of education: N/A     Occupational History    Not on file. Social History Main Topics    Smoking status: Former Smoker     Years: 20.00     Quit date: 1/1/2008    Smokeless tobacco: Never Used    Alcohol use Yes      Comment: \"every once and a while\"    Drug use: No    Sexual activity: No     Other Topics Concern    Not on file     Social History Narrative       I have reviewed the nurses notes, vitals, problem list, allergy list, medical history, family, social history and medications. Review of Symptoms:    General: Pt denies excessive weight gain or loss. Pt is able to conduct ADL's  HEENT: Denies blurred vision, headaches, epistaxis and difficulty swallowing. Respiratory: Denies shortness of breath, CONDON, wheezing or stridor. Cardiovascular: Denies precordial pain, palpitations, edema or PND  Gastrointestinal: Denies poor appetite, indigestion, abdominal pain or blood in stool  Musculoskeletal: Denies pain or swelling from muscles or joints  Neurologic: Denies tremor, paresthesias, or sensory motor disturbance  Skin: Denies rash, itching or texture change. Physical Exam:      General: Well developed, in no acute distress, cooperative and alert  HEENT: No carotid bruits, no JVD, trach is midline. Neck Supple, PEERL, EOM intact. Heart:  Normal S1/S2 negative S3 or S4. Regular, no murmur, gallop or rub.   Respiratory: Clear bilaterally x 4, no wheezing or rales  Abdomen:   Soft, non-tender, no masses, bowel sounds are active.   Extremities:  No edema, normal cap refill, no cyanosis, atraumatic. Neuro: A&Ox3, speech clear, gait stable. Skin: Skin color is normal. No rashes or lesions.  Non diaphoretic  Vascular: 2+ pulses symmetric in all extremities    Cardiographics    ECG: Sinus rhythm  Results for orders placed or performed during the hospital encounter of 09/22/16   EKG, 12 LEAD, INITIAL   Result Value Ref Range    Ventricular Rate 80 BPM    Atrial Rate 80 BPM    P-R Interval 160 ms    QRS Duration 86 ms    Q-T Interval 420 ms    QTC Calculation (Bezet) 484 ms    Calculated P Axis 33 degrees    Calculated R Axis 55 degrees    Calculated T Axis 61 degrees    Diagnosis       Normal sinus rhythm  Anterior infarct (cited on or before 13-SEP-2016)    When compared with ECG of 15-SEP-2016 15:19,  ST less elevated in Anterior leads  T wave amplitude has increased in Lateral leads  Confirmed by Michael Zarate MD, Eduardo Horowitz ([id number]) on 9/22/2016 1:27:14 PM           Cardiology Labs:  Lab Results   Component Value Date/Time    Cholesterol, total 135 04/21/2017 08:33 AM    HDL Cholesterol 60 04/21/2017 08:33 AM    LDL, calculated 66 04/21/2017 08:33 AM    Triglyceride 46 04/21/2017 08:33 AM    CHOL/HDL Ratio 5.1 (H) 09/14/2016 05:28 AM       Lab Results   Component Value Date/Time    Sodium 140 06/21/2017 03:03 PM    Potassium 5.6 (H) 06/21/2017 03:03 PM    Chloride 103 06/21/2017 03:03 PM    CO2 22 06/21/2017 03:03 PM    Anion gap 9 09/22/2016 10:34 AM    Glucose 80 06/21/2017 03:03 PM    BUN 17 06/21/2017 03:03 PM    Creatinine 1.09 06/21/2017 03:03 PM    BUN/Creatinine ratio 16 06/21/2017 03:03 PM    GFR est AA 80 06/21/2017 03:03 PM    GFR est non-AA 69 06/21/2017 03:03 PM    Calcium 9.3 06/21/2017 03:03 PM    Bilirubin, total 0.3 01/24/2017 07:54 AM    AST (SGOT) 18 01/24/2017 07:54 AM    Alk. phosphatase 95 01/24/2017 07:54 AM    Protein, total 6.9 01/24/2017 07:54 AM    Albumin 4.1 01/24/2017 07:54 AM    Globulin 3.7 09/22/2016 10:34 AM    A-G Ratio 1.5 01/24/2017 07:54 AM    ALT (SGPT) 14 01/24/2017 07:54 AM           Assessment:     Assessment:     Diagnoses and all orders for this visit:    1.  ASHD (arteriosclerotic heart disease)  -     AMB POC EKG ROUTINE W/ 12 LEADS, INTER & REP  -     METABOLIC PANEL, COMPREHENSIVE  -     CK  - LIPID PANEL    2. Chronic total occlusion of coronary artery  -     METABOLIC PANEL, COMPREHENSIVE  -     CK  -     LIPID PANEL    3. S/P CABG x 4  -     METABOLIC PANEL, COMPREHENSIVE  -     CK  -     LIPID PANEL    4. High cholesterol  -     METABOLIC PANEL, COMPREHENSIVE  -     CK  -     LIPID PANEL        ICD-10-CM ICD-9-CM    1. ASHD (arteriosclerotic heart disease) I25.10 414.00 AMB POC EKG ROUTINE W/ 12 LEADS, INTER & REP      METABOLIC PANEL, COMPREHENSIVE      CK      LIPID PANEL   2. Chronic total occlusion of coronary artery T38.79 845.04 METABOLIC PANEL, COMPREHENSIVE     414.2 CK      LIPID PANEL   3. S/P CABG x 4 Q62.0 O34.65 METABOLIC PANEL, COMPREHENSIVE      CK      LIPID PANEL   4. High cholesterol V42.10 241.2 METABOLIC PANEL, COMPREHENSIVE      CK      LIPID PANEL     Orders Placed This Encounter    METABOLIC PANEL, COMPREHENSIVE    CK    LIPID PANEL    AMB POC EKG ROUTINE W/ 12 LEADS, INTER & REP     Order Specific Question:   Reason for Exam:     Answer:   ROUTINE        Plan:     1. Atherosclerotic heart disease: History of CABG ×4 with : Continue current medications clinically stable  2. Hypertension: Controlled continue current medications  3. Hyperlipidemia: On statin therapy due for lipids, slip provided  Follow-up with cardiology in 6 months. Hal Hobbs NP    This note was created using voice recognition software. Despite editing, there may be syntax errors.

## 2018-03-30 LAB
ALBUMIN SERPL-MCNC: 4.3 G/DL (ref 3.5–4.8)
ALBUMIN/GLOB SERPL: 1.7 {RATIO} (ref 1.2–2.2)
ALP SERPL-CCNC: 91 IU/L (ref 39–117)
ALT SERPL-CCNC: 20 IU/L (ref 0–44)
AST SERPL-CCNC: 19 IU/L (ref 0–40)
BILIRUB SERPL-MCNC: 0.6 MG/DL (ref 0–1.2)
BUN SERPL-MCNC: 17 MG/DL (ref 8–27)
BUN/CREAT SERPL: 17 (ref 10–24)
CALCIUM SERPL-MCNC: 9.4 MG/DL (ref 8.6–10.2)
CHLORIDE SERPL-SCNC: 101 MMOL/L (ref 96–106)
CHOLEST SERPL-MCNC: 121 MG/DL (ref 100–199)
CK SERPL-CCNC: 142 U/L (ref 24–204)
CO2 SERPL-SCNC: 23 MMOL/L (ref 18–29)
CREAT SERPL-MCNC: 0.99 MG/DL (ref 0.76–1.27)
GFR SERPLBLD CREATININE-BSD FMLA CKD-EPI: 77 ML/MIN/1.73
GFR SERPLBLD CREATININE-BSD FMLA CKD-EPI: 89 ML/MIN/1.73
GLOBULIN SER CALC-MCNC: 2.6 G/DL (ref 1.5–4.5)
GLUCOSE SERPL-MCNC: 85 MG/DL (ref 65–99)
HDLC SERPL-MCNC: 42 MG/DL
INTERPRETATION, 910389: NORMAL
LDLC SERPL CALC-MCNC: 69 MG/DL (ref 0–99)
POTASSIUM SERPL-SCNC: 5.1 MMOL/L (ref 3.5–5.2)
PROT SERPL-MCNC: 6.9 G/DL (ref 6–8.5)
SODIUM SERPL-SCNC: 138 MMOL/L (ref 134–144)
TRIGL SERPL-MCNC: 51 MG/DL (ref 0–149)
VLDLC SERPL CALC-MCNC: 10 MG/DL (ref 5–40)

## 2018-03-30 NOTE — PROGRESS NOTES
Spoke to patient using 2 identifiers.   Per Amalia Henry, NP, pt was made aware that his labs are normal.

## 2018-07-06 RX ORDER — METOPROLOL TARTRATE 25 MG/1
TABLET, FILM COATED ORAL
Qty: 180 TAB | Refills: 3 | Status: SHIPPED | OUTPATIENT
Start: 2018-07-06 | End: 2019-11-13 | Stop reason: SDUPTHER

## 2018-07-17 RX ORDER — AMLODIPINE BESYLATE 2.5 MG/1
TABLET ORAL
Qty: 90 TAB | Refills: 1 | Status: SHIPPED | OUTPATIENT
Start: 2018-07-17 | End: 2018-10-19 | Stop reason: SDUPTHER

## 2018-09-27 RX ORDER — ATORVASTATIN CALCIUM 40 MG/1
TABLET, FILM COATED ORAL
Qty: 90 TAB | Refills: 1 | Status: SHIPPED | OUTPATIENT
Start: 2018-09-27 | End: 2018-10-19 | Stop reason: SDUPTHER

## 2018-10-19 ENCOUNTER — OFFICE VISIT (OUTPATIENT)
Dept: CARDIOLOGY CLINIC | Age: 73
End: 2018-10-19

## 2018-10-19 VITALS
DIASTOLIC BLOOD PRESSURE: 78 MMHG | OXYGEN SATURATION: 97 % | WEIGHT: 159.5 LBS | HEIGHT: 69 IN | SYSTOLIC BLOOD PRESSURE: 112 MMHG | BODY MASS INDEX: 23.62 KG/M2 | HEART RATE: 70 BPM

## 2018-10-19 DIAGNOSIS — E78.2 MIXED HYPERLIPIDEMIA: ICD-10-CM

## 2018-10-19 DIAGNOSIS — I25.10 ASHD (ARTERIOSCLEROTIC HEART DISEASE): Primary | ICD-10-CM

## 2018-10-19 DIAGNOSIS — Z95.1 S/P CABG X 4: ICD-10-CM

## 2018-10-19 DIAGNOSIS — I10 ESSENTIAL HYPERTENSION: ICD-10-CM

## 2018-10-19 NOTE — PROGRESS NOTES
Chief Complaint   Patient presents with    Other     6 MO. F/U ASHD      1. Have you been to the ER, urgent care clinic since your last visit? Hospitalized since your last visit? NO    2. Have you seen or consulted any other health care providers outside of the [address] [place] [address] since your last visit? Include any pap smears or colon screening.  NO

## 2018-11-27 DIAGNOSIS — Z95.1 S/P CABG X 4: ICD-10-CM

## 2018-11-28 RX ORDER — METOPROLOL TARTRATE 25 MG/1
12.5 TABLET, FILM COATED ORAL 2 TIMES DAILY
Qty: 180 TAB | Refills: 3 | Status: SHIPPED | OUTPATIENT
Start: 2018-11-28 | End: 2019-12-16 | Stop reason: SDUPTHER

## 2018-11-28 RX ORDER — AMLODIPINE BESYLATE 2.5 MG/1
2.5 TABLET ORAL DAILY
Qty: 90 TAB | Refills: 3 | Status: SHIPPED | OUTPATIENT
Start: 2018-11-28 | End: 2019-11-12 | Stop reason: SDUPTHER

## 2019-01-07 RX ORDER — ATORVASTATIN CALCIUM 40 MG/1
TABLET, FILM COATED ORAL
Qty: 90 TAB | Refills: 2 | Status: SHIPPED | OUTPATIENT
Start: 2019-01-07 | End: 2019-11-12 | Stop reason: SDUPTHER

## 2019-11-12 DIAGNOSIS — Z95.1 S/P CABG X 4: ICD-10-CM

## 2019-11-12 RX ORDER — AMLODIPINE BESYLATE 2.5 MG/1
TABLET ORAL
Qty: 90 TAB | Refills: 3 | Status: SHIPPED | OUTPATIENT
Start: 2019-11-12 | End: 2020-08-12

## 2019-11-12 RX ORDER — ATORVASTATIN CALCIUM 40 MG/1
TABLET, FILM COATED ORAL
Qty: 90 TAB | Refills: 2 | Status: SHIPPED | OUTPATIENT
Start: 2019-11-12 | End: 2019-11-19 | Stop reason: SDUPTHER

## 2019-11-12 NOTE — PROGRESS NOTES
Completed 4th Reclast NAME:  Iovnne Medeiros   :   1945   MRN:   [id number]   PCP:  Bessie Ramírez MD           Subjective: The patient is a 81y.o. year old male  who returns for a routine follow-up. Since the last visit, patient reports no change in exercise tolerance, chest pain, edema, medication intolerance, palpitations, shortness of breath, PND/orthopnea wheezing, sputum, syncope, dizziness or light headedness. Doing well. 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 ASHD (arteriosclerotic heart disease) I25.10 414.00 AMB POC EKG ROUTINE W/ 12 LEADS, INTER & REP   2. Mixed hyperlipidemia E78.2 272.2    3. Essential hypertension I10 401.9    4. S/P CABG x 4 Z95.1 V45.81       Social History     Tobacco Use    Smoking status: Former Smoker     Years: 20.00     Last attempt to quit: 2008     Years since quitting: 10.8    Smokeless tobacco: Never Used   Substance Use Topics    Alcohol use: Yes     Comment: \"every once and a while\"      Family History   Problem Relation Age of Onset    Heart Disease Father     Heart Disease Maternal Grandfather     Cancer Mother         Lung cacer - non-smoker    Heart Disease Brother     Other Son         Back issues    Anxiety Daughter         Review of Systems  General: Pt denies excessive weight gain or loss. Pt is able to conduct ADL's  HEENT: Denies blurred vision, headaches, epistaxis and difficulty swallowing. Respiratory: Denies shortness of breath, CONDON, wheezing or stridor.   Cardiovascular: Denies precordial pain, palpitations, edema or PND  Gastrointestinal: Denies poor appetite, indigestion, abdominal pain or blood in stool  Musculoskeletal: Denies pain or swelling from muscles or joints  Neurologic: Denies tremor, paresthesias, or sensory motor disturbance  Skin: Denies rash, itching or texture change. Objective:       Vitals:    10/19/18 0910 10/19/18 0919   BP: 124/76 112/78   Pulse: 70    SpO2: 97%    Weight: 159 lb 8 oz (72.3 kg)    Height: 5' 9\" (1.753 m)     Body mass index is 23.55 kg/m². General PE  Mental Status - Alert. General Appearance - Not in acute distress. Chest and Lung Exam   Inspection: Accessory muscles - No use of accessory muscles in breathing. Auscultation:   Breath sounds: - Normal.    Cardiovascular   Inspection: Jugular vein - Bilateral - Inspection Normal.  Palpation/Percussion:   Apical Impulse: - Normal.  Auscultation: Rhythm - Regular. Heart Sounds - S1 WNL and S2 WNL. No S3 or S4. Murmurs & Other Heart Sounds: Auscultation of the heart reveals - No Murmurs. Peripheral Vascular   Upper Extremity: Inspection - Bilateral - No Cyanotic nailbeds or Digital clubbing. Lower Extremity:   Palpation: Edema - Bilateral - No edema. Data Review:     EKG -EKG: normal EKG, normal sinus rhythm, unchanged from previous tracings. Medications reviewed  Current Outpatient Medications   Medication Sig    atorvastatin (LIPITOR) 40 mg tablet TAKE 1 TABLET BY MOUTH EVERY EVENING    amLODIPine (NORVASC) 2.5 mg tablet TAKE 1 TABLET BY MOUTH EVERY DAY    metoprolol tartrate (LOPRESSOR) 25 mg tablet Take 0.5 Tabs by mouth two (2) times a day.  ascorbic acid, vitamin C, (VITAMIN C) 500 mg tablet Take 1,000 mg by mouth two (2) times a day.  metoprolol tartrate (LOPRESSOR) 25 mg tablet TAKE 1 TAB BY MOUTH TWO (2) TIMES A DAY. No current facility-administered medications for this visit. Assessment:       ICD-10-CM ICD-9-CM    1. ASHD (arteriosclerotic heart disease) I25.10 414.00 AMB POC EKG ROUTINE W/ 12 LEADS, INTER & REP   2. Mixed hyperlipidemia E78.2 272.2    3. Essential hypertension I10 401.9    4.  S/P CABG x 4 Z95.1 V45.81         Plan:     Patient presents doing well and stable from cardiac standpoint. Lipids at goal 3/18. Continue current care and follow up in one year.     Tessy Celestin MD

## 2019-11-13 ENCOUNTER — OFFICE VISIT (OUTPATIENT)
Dept: CARDIOLOGY CLINIC | Age: 74
End: 2019-11-13

## 2019-11-13 VITALS
WEIGHT: 161.2 LBS | RESPIRATION RATE: 16 BRPM | OXYGEN SATURATION: 97 % | HEART RATE: 65 BPM | HEIGHT: 69 IN | DIASTOLIC BLOOD PRESSURE: 70 MMHG | SYSTOLIC BLOOD PRESSURE: 124 MMHG | BODY MASS INDEX: 23.88 KG/M2

## 2019-11-13 DIAGNOSIS — I05.9 MITRAL VALVE DISEASE: ICD-10-CM

## 2019-11-13 DIAGNOSIS — I25.10 ASHD (ARTERIOSCLEROTIC HEART DISEASE): Primary | ICD-10-CM

## 2019-11-13 DIAGNOSIS — E78.2 MIXED HYPERLIPIDEMIA: ICD-10-CM

## 2019-11-13 NOTE — PROGRESS NOTES
NAME:  Shu Turner   :   1945   MRN:   [id number]   PCP:  Mandy Macias MD           Subjective: The patient is a 69y.o. year old male  who returns for a routine follow-up. Since the last visit, patient reports no change in exercise tolerance, chest pain, edema, medication intolerance, palpitations, shortness of breath, PND/orthopnea wheezing, sputum, syncope, dizziness or light headedness. Doing well. 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 ASHD (arteriosclerotic heart disease) I25.10 414.00 AMB POC EKG ROUTINE W/ 12 LEADS, INTER & REP   2. Mixed hyperlipidemia E78.2 272.2 LIPID PANEL      HEPATIC FUNCTION PANEL   3. Mitral valve disease I05.9 394.9 ECHO ADULT COMPLETE      Social History     Tobacco Use    Smoking status: Former Smoker     Years: 20.00     Last attempt to quit: 2008     Years since quittin.8    Smokeless tobacco: Never Used   Substance Use Topics    Alcohol use: Yes     Comment: \"every once and a while\"      Family History   Problem Relation Age of Onset    Heart Disease Father     Heart Disease Maternal Grandfather     Cancer Mother         Lung cacer - non-smoker    Heart Disease Brother     Other Son         Back issues    Anxiety Daughter         Review of Systems  General: Pt denies excessive weight gain or loss. Pt is able to conduct ADL's  HEENT: Denies blurred vision, headaches, epistaxis and difficulty swallowing. Respiratory: Denies shortness of breath, CONDON, wheezing or stridor.   Cardiovascular: Denies precordial pain, palpitations, edema or PND  Gastrointestinal: Denies poor appetite, indigestion, abdominal pain or blood in stool  Musculoskeletal: Denies pain or swelling from muscles or joints  Neurologic: Denies tremor, paresthesias, or sensory motor disturbance  Skin: Denies rash, itching or texture change. Objective:       Vitals:    11/13/19 1029 11/13/19 1035   BP: 130/70 124/70   Pulse: 65    Resp: 16    SpO2: 97%    Weight: 161 lb 3.2 oz (73.1 kg)    Height: 5' 9\" (1.753 m)     Body mass index is 23.81 kg/m². General PE  Mental Status - Alert. General Appearance - Not in acute distress. Chest and Lung Exam   Inspection: Accessory muscles - No use of accessory muscles in breathing. Auscultation:   Breath sounds: - Normal.    Cardiovascular   Inspection: Jugular vein - Bilateral - Inspection Normal.  Palpation/Percussion:   Apical Impulse: - Normal.  Auscultation: Rhythm - Regular. Heart Sounds - S1 WNL and S2 WNL. No S3 or S4. Murmurs & Other Heart Sounds: Auscultation of the heart reveals - No Murmurs. Peripheral Vascular   Upper Extremity: Inspection - Bilateral - No Cyanotic nailbeds or Digital clubbing. Lower Extremity:   Palpation: Edema - Bilateral - No edema. Data Review:     EKG -EKG: normal EKG, normal sinus rhythm, unchanged from previous tracings. Medications reviewed  Current Outpatient Medications   Medication Sig    amLODIPine (NORVASC) 2.5 mg tablet TAKE 1 TABLET BY MOUTH  DAILY    atorvastatin (LIPITOR) 40 mg tablet TAKE 1 TABLET BY MOUTH  DAILY    metoprolol tartrate (LOPRESSOR) 25 mg tablet Take 0.5 Tabs by mouth two (2) times a day.  ascorbic acid, vitamin C, (VITAMIN C) 500 mg tablet Take 1,000 mg by mouth two (2) times a day. No current facility-administered medications for this visit. Assessment:       ICD-10-CM ICD-9-CM    1. ASHD (arteriosclerotic heart disease) I25.10 414.00 AMB POC EKG ROUTINE W/ 12 LEADS, INTER & REP   2. Mixed hyperlipidemia E78.2 272.2 LIPID PANEL      HEPATIC FUNCTION PANEL   3. Mitral valve disease I05.9 394.9 ECHO ADULT COMPLETE        Plan:     Patient presents doing well and stable from cardiac standpoint.  Remains asymptomatic. Check lipids. intra op NASIR showed mild to moderate MR. Will evaluate with echo. Check lipids. Continue current care and follow up in 6 months.     Werner Pitts MD

## 2019-11-13 NOTE — PROGRESS NOTES
1. Have you been to the ER, urgent care clinic since your last visit? Hospitalized since your last visit? NO    2. Have you seen or consulted any other health care providers outside of the [address] [place] [address] since your last visit? Include any pap smears or colon screening. NO    ANNUAL.  NO CARDIAC C/O,

## 2019-11-14 LAB
ALBUMIN SERPL-MCNC: 4.3 G/DL (ref 3.5–4.8)
ALP SERPL-CCNC: 96 IU/L (ref 39–117)
ALT SERPL-CCNC: 17 IU/L (ref 0–44)
AST SERPL-CCNC: 19 IU/L (ref 0–40)
BILIRUB DIRECT SERPL-MCNC: 0.12 MG/DL (ref 0–0.4)
BILIRUB SERPL-MCNC: 0.5 MG/DL (ref 0–1.2)
CHOLEST SERPL-MCNC: 142 MG/DL (ref 100–199)
HDLC SERPL-MCNC: 42 MG/DL
INTERPRETATION, 910389: NORMAL
LDLC SERPL CALC-MCNC: 88 MG/DL (ref 0–99)
PROT SERPL-MCNC: 6.9 G/DL (ref 6–8.5)
TRIGL SERPL-MCNC: 59 MG/DL (ref 0–149)
VLDLC SERPL CALC-MCNC: 12 MG/DL (ref 5–40)

## 2019-11-14 NOTE — PROGRESS NOTES
Narcisa Hills,    Please call the patient and inform I reviewed his lipids. LDL has bumped. Previously 71, now 80. Goal is < 70. Has he had any changes to lipitor or diet/exercise? If no changes in the way he is taking meds and/or diet/exercise, then recommend increasing lipitor to 80 mg daily. If he has cut back on dose of lipitor, will need to know why and recommend going back to 40 mg daily unless he has been having myalgias/muscle cramps, joint aches.     Thanks,  [employer]

## 2019-11-19 RX ORDER — ATORVASTATIN CALCIUM 80 MG/1
80 TABLET, FILM COATED ORAL DAILY
Qty: 90 TAB | Refills: 3 | Status: SHIPPED | OUTPATIENT
Start: 2019-11-19 | End: 2020-08-12

## 2019-11-19 NOTE — PROGRESS NOTES
Verified patient with two identifiers. Spoke with patient regarding LABS results. Pt stted he is taking 40 mg of lipitor daily. Advised pt to start taking 80 mg, Pt stated he is not doing as much exercise as he once did.

## 2019-11-25 ENCOUNTER — TELEPHONE (OUTPATIENT)
Dept: CARDIOLOGY CLINIC | Age: 74
End: 2019-11-25

## 2019-11-25 NOTE — TELEPHONE ENCOUNTER
Verified patient with two identifiers. Spoke with pt advising him his leaky valve has improved since his last one 3 yrs ago, so essentially a normal ECHO. Pt verbalized understanding       EFRAIN Olsen LPN   Caller: Unspecified (Today, 10:09 AM)             Angel Loami,     Please call the patient and tell him I reviewed his echocardiogram.   His heart function is normal, so there is no concern for heart failure. He had mild to moderate mitral valve regurgitation on his echo done THREE YEARS AGO. This echocardiogram shows an IMPROVEMENT in the mitral valve function.  Now only leaking mildly, so this is great. We would not make any changes at this time.  Continue with the current medications he is taking and keep the same follow-up appt with Dr. Nena Meyers as scheduled. If he wants to speak with Dr. Nena Meyers personally, will need an appt to discuss.      Thanks,

## 2019-11-25 NOTE — TELEPHONE ENCOUNTER
Patient is asking if Echo showed a leaky valve. He ask that Dr. Dylan Silva could call and answer this question, not the nurse. Thanks!

## 2019-12-17 RX ORDER — METOPROLOL TARTRATE 25 MG/1
12.5 TABLET, FILM COATED ORAL 2 TIMES DAILY
Qty: 180 TAB | Refills: 3 | Status: SHIPPED | OUTPATIENT
Start: 2019-12-17 | End: 2020-12-31 | Stop reason: SDUPTHER

## 2020-05-19 ENCOUNTER — VIRTUAL VISIT (OUTPATIENT)
Dept: CARDIOLOGY CLINIC | Age: 75
End: 2020-05-19

## 2020-05-19 VITALS — BODY MASS INDEX: 23.78 KG/M2 | HEIGHT: 69 IN

## 2020-05-19 DIAGNOSIS — Z95.1 S/P CABG X 4: ICD-10-CM

## 2020-05-19 DIAGNOSIS — I10 ESSENTIAL HYPERTENSION: ICD-10-CM

## 2020-05-19 DIAGNOSIS — I05.9 MITRAL VALVE DISEASE: ICD-10-CM

## 2020-05-19 DIAGNOSIS — I25.10 ASHD (ARTERIOSCLEROTIC HEART DISEASE): Primary | ICD-10-CM

## 2020-05-19 DIAGNOSIS — E78.2 MIXED HYPERLIPIDEMIA: ICD-10-CM

## 2020-05-19 RX ORDER — ASPIRIN 81 MG/1
81 TABLET ORAL DAILY
COMMUNITY

## 2020-05-19 NOTE — PROGRESS NOTES
Cardiology Telemedicine Encounter    Remberto Polanco is a 69 y.o. male who was seen by synchronous (real-time) audio-video technology on 5/19/2020           Subjective/HPI:     Remberto Polanco is a 69 y.o. male is here for routine follow-up via virtual visit. He has a PMHx of CAD s/p CABG, MVP with mild MR. Denies any chest pain, SOB, edema, palpitations. Has extensive dental work pending, significant periodontal disease. 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  metoprolol tartrate (LOPRESSOR) 25 mg tablet Take 0.5 Tabs by mouth two (2) times a day. 180 Tab 3    atorvastatin (LIPITOR) 80 mg tablet Take 1 Tab by mouth daily. 90 Tab 3    amLODIPine (NORVASC) 2.5 mg tablet TAKE 1 TABLET BY MOUTH  DAILY 90 Tab 3    ascorbic acid, vitamin C, (VITAMIN C) 500 mg tablet Take 500 mg by mouth two (2) times a day. No facility-administered encounter medications on file as of 5/19/2020. Review of Symptoms:    Review of Systems   Constitutional: Negative. Negative for chills and fever. HENT: Negative. Negative for hearing loss. Respiratory: Negative. Negative for cough, hemoptysis and shortness of breath. Cardiovascular: Negative. Negative for chest pain, palpitations, orthopnea, claudication, leg swelling and PND. Gastrointestinal: Negative. Negative for nausea and vomiting. Musculoskeletal: Negative for myalgias. Skin: Negative. Negative for rash.    Neurological: Negative. Negative for dizziness, loss of consciousness and headaches. Physical Exam:      General: Well developed, in no acute distress, cooperative and alert  HEENT: trach is midline. PEERL, EOM intact. Respiratory: No audible wheezing, no acute respiratory distress, normal effort of breathing  Extremities:  No cyanosis, atraumatic. No lower extremity edema. Neuro: A&Ox3, speech clear  Skin: Skin color is normal. No rashes or lesions. Non diaphoretic  Due to this being a TeleHealth evaluation, many elements of the physical examination are unable to be assessed. Cardiology Labs:    Lab Results   Component Value Date/Time    Cholesterol, total 142 11/13/2019 11:30 AM    HDL Cholesterol 42 11/13/2019 11:30 AM    LDL, calculated 88 11/13/2019 11:30 AM    LDL, calculated 69 03/29/2018 10:42 AM    LDL, calculated 66 04/21/2017 08:33 AM    VLDL, calculated 12 11/13/2019 11:30 AM    CHOL/HDL Ratio 5.1 (H) 09/14/2016 05:28 AM       Lab Results   Component Value Date/Time    Hemoglobin A1c 6.0 09/14/2016 02:57 PM       Lab Results   Component Value Date/Time    Sodium 138 03/29/2018 10:42 AM    Potassium 5.1 03/29/2018 10:42 AM    Chloride 101 03/29/2018 10:42 AM    CO2 23 03/29/2018 10:42 AM    Glucose 85 03/29/2018 10:42 AM    BUN 17 03/29/2018 10:42 AM    Creatinine 0.99 03/29/2018 10:42 AM    BUN/Creatinine ratio 17 03/29/2018 10:42 AM    GFR est AA 89 03/29/2018 10:42 AM    GFR est non-AA 77 03/29/2018 10:42 AM    Calcium 9.4 03/29/2018 10:42 AM    Anion gap 9 09/22/2016 10:34 AM    Bilirubin, total 0.5 11/13/2019 11:30 AM    ALT (SGPT) 17 11/13/2019 11:30 AM    AST (SGOT) 19 11/13/2019 11:30 AM    Alk. phosphatase 96 11/13/2019 11:30 AM    Protein, total 6.9 11/13/2019 11:30 AM    Albumin 4.3 11/13/2019 11:30 AM    Globulin 3.7 09/22/2016 10:34 AM    A-G Ratio 1.7 03/29/2018 10:42 AM          Assessment:       ICD-10-CM ICD-9-CM    1. ASHD (arteriosclerotic heart disease) I25.10 414.00    2.  Mixed hyperlipidemia E78.2 272.2 LIPID PANEL      HEPATIC FUNCTION PANEL      CK   3. S/P CABG x 4 Z95.1 V45.81    4. Essential hypertension I10 401.9    5. Mitral valve disease I05.9 394.9         Plan:     1. ASHD (arteriosclerotic heart disease)  Stable. 2. Mixed hyperlipidemia  Stain increased last visit. Will check labs. - LIPID PANEL  - HEPATIC FUNCTION PANEL  - CK    3. S/P CABG x 4      4. Essential hypertension  Controlled. 5. Mitral valve disease  Stable. Recommend antibiotic prophylaxis prior to his dental work. F/u in 6 months. Norina Lesch, MD        This visit was completed using [employer] Virtual Visit telemedicine services    Pursuant to the emergency declaration under the [facility] waiver authority and the Coronavirus Preparedness and [employer] Act, this Virtual Visit was conducted, with patient's consent, to reduce the patient's risk of exposure to COVID-19 and provide continuity of care for an established patient. Services were provided through a video synchronous discussion virtually to substitute for in-person clinic visit.

## 2020-05-19 NOTE — PROGRESS NOTES
6 MONTH FOLLOW UP. NO BP MONITOR. NO CARDIAC C/O. NEED TO HAVE DEEP CLEANING ON TEETH, ASKING IF NEEDS AN ABX, FIRST.

## 2020-06-06 LAB
ALBUMIN SERPL-MCNC: 4.4 G/DL (ref 3.7–4.7)
ALP SERPL-CCNC: 106 IU/L (ref 39–117)
ALT SERPL-CCNC: 35 IU/L (ref 0–44)
AST SERPL-CCNC: 36 IU/L (ref 0–40)
BILIRUB DIRECT SERPL-MCNC: 0.15 MG/DL (ref 0–0.4)
BILIRUB SERPL-MCNC: 0.5 MG/DL (ref 0–1.2)
CHOLEST SERPL-MCNC: 148 MG/DL (ref 100–199)
CK SERPL-CCNC: 79 U/L (ref 41–331)
HDLC SERPL-MCNC: 48 MG/DL
INTERPRETATION, 910389: NORMAL
LDLC SERPL CALC-MCNC: 87 MG/DL (ref 0–99)
PROT SERPL-MCNC: 7.4 G/DL (ref 6–8.5)
TRIGL SERPL-MCNC: 64 MG/DL (ref 0–149)
VLDLC SERPL CALC-MCNC: 13 MG/DL (ref 5–40)

## 2020-06-08 DIAGNOSIS — E78.2 MIXED HYPERLIPIDEMIA: ICD-10-CM

## 2020-06-08 DIAGNOSIS — I25.10 ASHD (ARTERIOSCLEROTIC HEART DISEASE): Primary | ICD-10-CM

## 2020-06-08 RX ORDER — EZETIMIBE 10 MG/1
TABLET ORAL
COMMUNITY
End: 2020-06-08 | Stop reason: SDUPTHER

## 2020-06-08 RX ORDER — EZETIMIBE 10 MG/1
10 TABLET ORAL DAILY
Qty: 90 TAB | Refills: 3 | Status: SHIPPED | OUTPATIENT
Start: 2020-06-08 | End: 2021-04-05

## 2020-06-08 NOTE — PROGRESS NOTES
Verified patient with two identifiers. Spoke with patient regarding LAB results. Advised pt to start taking Zetia 10 mg daily along with 80 mg of Lipitor.   Will mail lab slip to have labs drawn in 3 months

## 2020-06-08 NOTE — PROGRESS NOTES
Kimberly Plan,    Please call patient and inform that cholesterol numbers have not improved much from last check 6 months ago. He was supposed to have increased Lipitor to 80 mg back in 11/2019. Did he do so? If not, please increase lipitor to 80 mg daily. If he has been taking 80 mg Lipitor daily since 11/2019, then will need to add Zetia for further LDL reduction. He was previously well controlled, but somehow numbers have gone up in the past 1 year. Advise to be strict with his diet and increase exercise. Again, if he has been taking lipitor 80 mg, then will need to add Zetia. Check labs again in 3 months.     Thanks,  [employer]

## 2020-08-12 DIAGNOSIS — Z95.1 S/P CABG X 4: ICD-10-CM

## 2020-08-12 RX ORDER — AMLODIPINE BESYLATE 2.5 MG/1
TABLET ORAL
Qty: 90 TAB | Refills: 3 | Status: SHIPPED | OUTPATIENT
Start: 2020-08-12 | End: 2021-09-07

## 2020-08-12 RX ORDER — ATORVASTATIN CALCIUM 80 MG/1
TABLET, FILM COATED ORAL
Qty: 90 TAB | Refills: 3 | Status: SHIPPED | OUTPATIENT
Start: 2020-08-12 | End: 2021-09-07

## 2020-09-08 DIAGNOSIS — I25.10 ASHD (ARTERIOSCLEROTIC HEART DISEASE): ICD-10-CM

## 2020-09-08 DIAGNOSIS — E78.2 MIXED HYPERLIPIDEMIA: ICD-10-CM

## 2020-09-09 LAB
CHOLEST SERPL-MCNC: 103 MG/DL (ref 100–199)
HDLC SERPL-MCNC: 44 MG/DL
INTERPRETATION, 910389: NORMAL
LDLC SERPL CALC-MCNC: 46 MG/DL (ref 0–99)
TRIGL SERPL-MCNC: 55 MG/DL (ref 0–149)
VLDLC SERPL CALC-MCNC: 13 MG/DL (ref 5–40)

## 2020-09-17 ENCOUNTER — TELEPHONE (OUTPATIENT)
Dept: CARDIOLOGY CLINIC | Age: 75
End: 2020-09-17

## 2020-09-17 NOTE — PROGRESS NOTES
Deyanira,    Please call the patient and inform that lipids are now at goal.  Continue all meds, no changes.     Thanks,  [employer]

## 2020-09-17 NOTE — TELEPHONE ENCOUNTER
----- Message from Jose F Valdez NP sent at 9/17/2020  8:15 AM EDT -----  Jamari Colin,    Please call the patient and inform that lipids are now at goal.  Continue all meds, no changes.     Thanks,  [employer]

## 2020-10-14 ENCOUNTER — OFFICE VISIT (OUTPATIENT)
Dept: SURGERY | Age: 75
End: 2020-10-14
Payer: MEDICARE

## 2020-10-14 VITALS
DIASTOLIC BLOOD PRESSURE: 78 MMHG | HEART RATE: 52 BPM | TEMPERATURE: 96.8 F | BODY MASS INDEX: 21.92 KG/M2 | HEIGHT: 69 IN | RESPIRATION RATE: 18 BRPM | WEIGHT: 148 LBS | SYSTOLIC BLOOD PRESSURE: 125 MMHG

## 2020-10-14 DIAGNOSIS — K40.20 NON-RECURRENT BILATERAL INGUINAL HERNIA WITHOUT OBSTRUCTION OR GANGRENE: Primary | ICD-10-CM

## 2020-10-14 PROCEDURE — 99204 OFFICE O/P NEW MOD 45 MIN: CPT | Performed by: SURGERY

## 2020-10-14 PROCEDURE — G8420 CALC BMI NORM PARAMETERS: HCPCS | Performed by: SURGERY

## 2020-10-14 PROCEDURE — 3017F COLORECTAL CA SCREEN DOC REV: CPT | Performed by: SURGERY

## 2020-10-14 PROCEDURE — G8427 DOCREV CUR MEDS BY ELIG CLIN: HCPCS | Performed by: SURGERY

## 2020-10-14 PROCEDURE — G8432 DEP SCR NOT DOC, RNG: HCPCS | Performed by: SURGERY

## 2020-10-14 PROCEDURE — G8536 NO DOC ELDER MAL SCRN: HCPCS | Performed by: SURGERY

## 2020-10-14 PROCEDURE — G8754 DIAS BP LESS 90: HCPCS | Performed by: SURGERY

## 2020-10-14 PROCEDURE — G8752 SYS BP LESS 140: HCPCS | Performed by: SURGERY

## 2020-10-14 PROCEDURE — 1101F PT FALLS ASSESS-DOCD LE1/YR: CPT | Performed by: SURGERY

## 2020-10-14 NOTE — PROGRESS NOTES
Charlene Mayfield is a 69 y.o. male  Chief Complaint   Patient presents with    Hernia (Non Specific)     Self Referred     Health Maintenance Due   Topic Date Due    Hepatitis C Screening  1945    DTaP/Tdap/Td series (1 - Tdap) 11/12/1968    Shingrix Vaccine Age 50> (1 of 2) 11/12/1997    FOBT Q1Y Age 50-75  11/12/1997    GLAUCOMA SCREENING Q2Y  11/12/2012    Pneumococcal 65+ years (1 of 1 - PPSV23) 11/12/2012    Medicare Yearly Exam  06/22/2018    Flu Vaccine (1) 09/01/2020     Visit Vitals  /78 (BP 1 Location: Left arm, BP Patient Position: Sitting)   Pulse (!) 52   Temp 96.8 °F (36 °C) (Temporal)   Resp 18   Ht 5' 9\" (1.753 m)   Wt 148 lb (67.1 kg)   BMI 21.86 kg/m²     1. Have you been to the ER, urgent care clinic since your last visit? Hospitalized since your last visit? No     2. Have you seen or consulted any other health care providers outside of the [address] [place] [address] since your last visit? Include any pap smears or colon screening.  No

## 2020-10-14 NOTE — PATIENT INSTRUCTIONS

## 2020-10-14 NOTE — H&P (VIEW-ONLY)
HISTORY OF PRESENT ILLNESS 
Leonel Jackson is a 69 y.o. male who comes in for consultation by Lexy Ross MD and family for a possible hernia HPI He was doing some straining while constipated 3 weeks ago and noted discomfort in the left groin. The next day he noted some swelling. The swelling reduces when laying down. It is uncomfortable but not painful. He has some constipation but denies nausea, vomiting, diarrhea, constipation, melena, hematochezia, dysuria or hematuria, urinary hesitancy or frequency. He has not had surgery in the area previously. He his very active walking, riding a bike, lifting, hunting etc. 
 
Past Medical History:  
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  
Procedure Laterality Date  HX CORONARY ARTERY BYPASS GRAFT  09/15/2016  HX HEENT    
 tonsils- childhood and wisdom teeth   HX ORTHOPAEDIC    
 left calcaneous surgery   Family History Problem Relation Age of Onset  Heart Disease Father  Heart Disease Maternal Grandfather  Cancer Mother Lung cacer - non-smoker  Heart Disease Brother  Other Son Back issues  Anxiety Daughter Social History Tobacco Use  Smoking status: Former Smoker Years: 20.00 Last attempt to quit: 2008 Years since quittin.7  Smokeless tobacco: Never Used Substance Use Topics  Alcohol use: Yes Comment: \"every once and a while\"  Drug use: No  
 
Current Outpatient Medications Medication Sig  
 amLODIPine (NORVASC) 2.5 mg tablet TAKE 1 TABLET BY MOUTH  DAILY  atorvastatin (LIPITOR) 80 mg tablet TAKE 1 TABLET BY MOUTH  DAILY  ezetimibe (Zetia) 10 mg tablet Take 1 Tab by mouth daily.  aspirin delayed-release 81 mg tablet Take  by mouth daily.  metoprolol tartrate (LOPRESSOR) 25 mg tablet Take 0.5 Tabs by mouth two (2) times a day.  ascorbic acid, vitamin C, (VITAMIN C) 500 mg tablet Take 500 mg by mouth two (2) times a day. No current facility-administered medications for this visit. Allergies Allergen Reactions  Tramadol   
  hallucinations and syncope necessitating ER trip  Tramadol Other (comments)  
  fainted Review of Systems Constitutional: Negative for chills, diaphoresis, fever, malaise/fatigue and weight loss. HENT: Negative for congestion, ear pain and sore throat. Eyes: Negative for blurred vision and pain. Respiratory: Negative for cough, hemoptysis, sputum production, shortness of breath, wheezing and stridor. Cardiovascular: Negative for chest pain, palpitations, orthopnea, claudication, leg swelling and PND. Gastrointestinal: Positive for abdominal pain and constipation. Negative for blood in stool, diarrhea, heartburn, melena, nausea and vomiting. Genitourinary: Negative for dysuria, flank pain, frequency, hematuria and urgency. Musculoskeletal: Negative for back pain, joint pain, myalgias and neck pain. Skin: Negative for itching and rash. Neurological: Negative for dizziness, tremors, focal weakness, seizures, weakness and headaches. Endo/Heme/Allergies: Negative for polydipsia. Psychiatric/Behavioral: Negative for depression and memory loss. The patient is not nervous/anxious. Visit Vitals /78 (BP 1 Location: Left arm, BP Patient Position: Sitting) Pulse (!) 52 Temp 96.8 °F (36 °C) (Temporal) Resp 18 Ht 5' 9\" (1.753 m) Wt 67.1 kg (148 lb) BMI 21.86 kg/m² Physical Exam 
Constitutional:   
   General: He is not in acute distress. Appearance: Normal appearance. He is well-developed. He is not diaphoretic. HENT:  
   Head: Normocephalic and atraumatic. Mouth/Throat:  
   Pharynx: No oropharyngeal exudate. Comments: Poor dentition Eyes: General: No scleral icterus. Conjunctiva/sclera: Conjunctivae normal.  
   Pupils: Pupils are equal, round, and reactive to light. Neck: Musculoskeletal: Normal range of motion and neck supple. Thyroid: No thyromegaly. Trachea: No tracheal deviation. Cardiovascular:  
   Rate and Rhythm: Normal rate and regular rhythm. Heart sounds: Normal heart sounds. No murmur. No friction rub. No gallop. Pulmonary:  
   Effort: Pulmonary effort is normal. No respiratory distress. Breath sounds: Normal breath sounds. No stridor. No wheezing or rales. Abdominal:  
   General: Bowel sounds are normal. There is no distension. Palpations: Abdomen is soft. There is no mass. Tenderness: There is no abdominal tenderness. There is no guarding or rebound. Hernia: A hernia is present. Hernia is present in the left inguinal area (medium reducible) and right inguinal area (small reducible). There is no hernia in the ventral area. Genitourinary: 
   Penis: Circumcised. Scrotum/Testes: Normal. Cremasteric reflex is present. Musculoskeletal: Normal range of motion. General: No tenderness. Lymphadenopathy:  
   Cervical: No cervical adenopathy. Skin: 
   General: Skin is warm and dry. Findings: No erythema or rash. Neurological:  
   Mental Status: He is alert and oriented to person, place, and time. Cranial Nerves: No cranial nerve deficit. Coordination: Coordination normal.  
Psychiatric:     
   Behavior: Behavior normal.     
   Thought Content: Thought content normal.     
   Judgment: Judgment normal.  
 
 
 
ASSESSMENT and PLAN 1. Bilateral L>>R inguinal herniae. I explained about the anatomy and pathophysiology of hernias and the risk of incarceration and strangulation of the bowel. I explained about hernia repairs (open with and without mesh, and robotic assisted and laparoscopic with mesh).   I explained the risks and benefits of repair including bleeding, infection, chronic pain, orchalgia, loss of testes, bowel or bladder injury, hernia recurrence, seroma, mesh infection requiring removal.  I explained it would be a six to eight week recuperation with no driving for 5 - 7 days, no lifting for six weeks. 2.   CAD  S/p CAB x 4 9/2016. Followed by Dr Trevino Sensing On ASA Would need clearance/risk assessment prior to surgery 3. Poor dentition. He has had some teeth removed and needs several other removed and teeth cleaning prior to having a plate made I would not want any dental work done for two months after surgery He is going to have the dental work done prior to the hernia repair He wishes to proceed with a laparoscopic totally extraperitoneal preperitoneal bilateral inguinal hernia repair with mesh under general anesthesia as an outpatient He will return prior to surgery depending upon how much time passes Idalia Pinzon MD FACS

## 2020-10-14 NOTE — PROGRESS NOTES
HISTORY OF PRESENT ILLNESS  Buck Jones is a 69 y.o. male who comes in for consultation by Kayla Crump MD and family for a possible hernia  HPI  He was doing some straining while constipated 3 weeks ago and noted discomfort in the left groin. The next day he noted some swelling. The swelling reduces when laying down. It is uncomfortable but not painful. He has some constipation but denies nausea, vomiting, diarrhea, constipation, melena, hematochezia, dysuria or hematuria, urinary hesitancy or frequency. He has not had surgery in the area previously. He his very active walking, riding a bike, lifting, hunting etc.    Past Medical History:   Diagnosis Date    Chronic total occlusion of coronary artery 2016    Coronary artery disease involving native coronary artery with unstable angina pectoris ([facility] [address] 2016     Past Surgical History:   Procedure Laterality Date    HX CORONARY ARTERY BYPASS GRAFT  09/15/2016    HX HEENT      tonsils- childhood and wisdom teeth     HX ORTHOPAEDIC      left calcaneous surgery       Family History   Problem Relation Age of Onset    Heart Disease Father     Heart Disease Maternal Grandfather     Cancer Mother         Lung cacer - non-smoker    Heart Disease Brother     Other Son         Back issues    Anxiety Daughter      Social History     Tobacco Use    Smoking status: Former Smoker     Years: 20.00     Last attempt to quit: 2008     Years since quittin.7    Smokeless tobacco: Never Used   Substance Use Topics    Alcohol use: Yes     Comment: \"every once and a while\"    Drug use: No     Current Outpatient Medications   Medication Sig    amLODIPine (NORVASC) 2.5 mg tablet TAKE 1 TABLET BY MOUTH  DAILY    atorvastatin (LIPITOR) 80 mg tablet TAKE 1 TABLET BY MOUTH  DAILY    ezetimibe (Zetia) 10 mg tablet Take 1 Tab by mouth daily.  aspirin delayed-release 81 mg tablet Take  by mouth daily.     metoprolol tartrate (LOPRESSOR) 25 mg tablet Take 0.5 Tabs by mouth two (2) times a day.  ascorbic acid, vitamin C, (VITAMIN C) 500 mg tablet Take 500 mg by mouth two (2) times a day. No current facility-administered medications for this visit. Allergies   Allergen Reactions    Tramadol      hallucinations and syncope necessitating ER trip    Tramadol Other (comments)     fainted       Review of Systems   Constitutional: Negative for chills, diaphoresis, fever, malaise/fatigue and weight loss. HENT: Negative for congestion, ear pain and sore throat. Eyes: Negative for blurred vision and pain. Respiratory: Negative for cough, hemoptysis, sputum production, shortness of breath, wheezing and stridor. Cardiovascular: Negative for chest pain, palpitations, orthopnea, claudication, leg swelling and PND. Gastrointestinal: Positive for abdominal pain and constipation. Negative for blood in stool, diarrhea, heartburn, melena, nausea and vomiting. Genitourinary: Negative for dysuria, flank pain, frequency, hematuria and urgency. Musculoskeletal: Negative for back pain, joint pain, myalgias and neck pain. Skin: Negative for itching and rash. Neurological: Negative for dizziness, tremors, focal weakness, seizures, weakness and headaches. Endo/Heme/Allergies: Negative for polydipsia. Psychiatric/Behavioral: Negative for depression and memory loss. The patient is not nervous/anxious. Visit Vitals  /78 (BP 1 Location: Left arm, BP Patient Position: Sitting)   Pulse (!) 52   Temp 96.8 °F (36 °C) (Temporal)   Resp 18   Ht 5' 9\" (1.753 m)   Wt 67.1 kg (148 lb)   BMI 21.86 kg/m²       Physical Exam  Constitutional:       General: He is not in acute distress. Appearance: Normal appearance. He is well-developed. He is not diaphoretic. HENT:      Head: Normocephalic and atraumatic. Mouth/Throat:      Pharynx: No oropharyngeal exudate. Comments: Poor dentition  Eyes:      General: No scleral icterus. Conjunctiva/sclera: Conjunctivae normal.      Pupils: Pupils are equal, round, and reactive to light. Neck:      Musculoskeletal: Normal range of motion and neck supple. Thyroid: No thyromegaly. Trachea: No tracheal deviation. Cardiovascular:      Rate and Rhythm: Normal rate and regular rhythm. Heart sounds: Normal heart sounds. No murmur. No friction rub. No gallop. Pulmonary:      Effort: Pulmonary effort is normal. No respiratory distress. Breath sounds: Normal breath sounds. No stridor. No wheezing or rales. Abdominal:      General: Bowel sounds are normal. There is no distension. Palpations: Abdomen is soft. There is no mass. Tenderness: There is no abdominal tenderness. There is no guarding or rebound. Hernia: A hernia is present. Hernia is present in the left inguinal area (medium reducible) and right inguinal area (small reducible). There is no hernia in the ventral area. Genitourinary:     Penis: Circumcised. Scrotum/Testes: Normal. Cremasteric reflex is present. Musculoskeletal: Normal range of motion. General: No tenderness. Lymphadenopathy:      Cervical: No cervical adenopathy. Skin:     General: Skin is warm and dry. Findings: No erythema or rash. Neurological:      Mental Status: He is alert and oriented to person, place, and time. Cranial Nerves: No cranial nerve deficit. Coordination: Coordination normal.   Psychiatric:         Behavior: Behavior normal.         Thought Content: Thought content normal.         Judgment: Judgment normal.         ASSESSMENT and PLAN  1. Bilateral L>>R inguinal herniae. I explained about the anatomy and pathophysiology of hernias and the risk of incarceration and strangulation of the bowel. I explained about hernia repairs (open with and without mesh, and robotic assisted and laparoscopic with mesh).   I explained the risks and benefits of repair including bleeding, infection, chronic pain, orchalgia, loss of testes, bowel or bladder injury, hernia recurrence, seroma, mesh infection requiring removal.  I explained it would be a six to eight week recuperation with no driving for 5 - 7 days, no lifting for six weeks. 2.   CAD  S/p CAB x 4 9/2016. Followed by Dr Rosetta Hutchinson  On ASA  Would need clearance/risk assessment prior to surgery  3. Poor dentition.   He has had some teeth removed and needs several other removed and teeth cleaning prior to having a plate made  I would not want any dental work done for two months after surgery    He is going to have the dental work done prior to the hernia repair      He wishes to proceed with a laparoscopic totally extraperitoneal preperitoneal bilateral inguinal hernia repair with mesh under general anesthesia as an outpatient    He will return prior to surgery depending upon how much time passes      Caroline Reyes MD FACS

## 2020-10-19 RX ORDER — IBUPROFEN 600 MG/1
TABLET ORAL
COMMUNITY
Start: 2020-09-17 | End: 2020-11-09

## 2020-10-19 RX ORDER — SODIUM FLUORIDE 1.1 G/100G
GEL, DENTIFRICE ORAL
COMMUNITY
Start: 2020-09-10

## 2020-10-20 NOTE — PROGRESS NOTES
Zerita Schirmer, [facility]    Subjective/HPI:     Liliana Graf is a 69 y.o. male is here for routine f/u. He has a PMHx of CAD s/p CABG x 4 and HLD. He requires cardiac clearance for upcoming hernia repair surgery. He is doing well. He denies any symptoms of chest pain, dizziness, orthopnea, PND or edema. He denies shortness of breath or palpitations. PCP Provider  Amberly Aguayo MD    Past Medical History:   Diagnosis Date    Coronary artery disease involving native coronary artery with unstable angina pectoris ([facility] [address] 9/14/2016    HLD (hyperlipidemia)     Hx of CABG 09/2016    LIMA to LAD; seq RA to ramus, OM and RCA        Allergies   Allergen Reactions    Tramadol      hallucinations and syncope necessitating ER trip    Tramadol Other (comments)     fainted        Outpatient Encounter Medications as of 10/26/2020   Medication Sig Dispense Refill    Denta 5000 Plus 1.1 % crea USE 3 TIMES A DAY TO BRUSH TEETH      ibuprofen (MOTRIN) 600 mg tablet TAKE 1 TABLET BY MOUTH EVERY 4 6 HOURS AS NEEDED FOR PAIN      amLODIPine (NORVASC) 2.5 mg tablet TAKE 1 TABLET BY MOUTH  DAILY 90 Tab 3    atorvastatin (LIPITOR) 80 mg tablet TAKE 1 TABLET BY MOUTH  DAILY 90 Tab 3    ezetimibe (Zetia) 10 mg tablet Take 1 Tab by mouth daily. 90 Tab 3    aspirin delayed-release 81 mg tablet Take  by mouth daily.  metoprolol tartrate (LOPRESSOR) 25 mg tablet Take 0.5 Tabs by mouth two (2) times a day. 180 Tab 3    ascorbic acid, vitamin C, (VITAMIN C) 500 mg tablet Take 500 mg by mouth two (2) times a day. No facility-administered encounter medications on file as of 10/26/2020. Review of Symptoms:    Review of Systems   Constitutional: Negative for chills, fever and weight loss. HENT: Negative for nosebleeds. Eyes: Negative for blurred vision and double vision. Respiratory: Negative for cough, shortness of breath and wheezing.     Cardiovascular: Negative for chest pain, palpitations, orthopnea, leg swelling and PND. Gastrointestinal: Negative for abdominal pain, blood in stool, diarrhea, nausea and vomiting. Musculoskeletal: Negative for joint pain. Skin: Negative for rash. Neurological: Negative for dizziness, tingling and loss of consciousness. Endo/Heme/Allergies: Does not bruise/bleed easily. Physical Exam:      General: Well developed, in no acute distress, cooperative and alert  HEENT: No carotid bruits, no JVD, trach is midline. Neck Supple, PEERL, EOM intact. Heart:  reg rate and rhythm; normal S1/S2; no murmurs, no gallops or rubs. Respiratory: Clear bilaterally x 4, no wheezing or rales  Abdomen:   Soft, non-tender, no distention, no masses. + BS. Extremities:  Normal cap refill, no cyanosis, atraumatic. No edema. Neuro: A&Ox3, speech clear, gait stable. Skin: Skin color is normal. No rashes or lesions.  Non diaphoretic  Vascular: 2+ pulses symmetric in all extremities    Vitals:    10/26/20 0933 10/26/20 0942   BP: 124/86 120/84   Pulse: (!) 52    Resp: 18    SpO2: 100%    Weight: 147 lb 14.4 oz (67.1 kg)    Height: 5' 9\" (1.753 m)        ECG: sinus bradycardia    Cardiology Labs:    Lab Results   Component Value Date/Time    Cholesterol, total 103 09/08/2020 10:59 AM    HDL Cholesterol 44 09/08/2020 10:59 AM    LDL, calculated 87 06/05/2020 11:27 AM    LDL, calculated 88 11/13/2019 11:30 AM    LDL, calculated 69 03/29/2018 10:42 AM    LDL Chol Calc (NIH) 46 09/08/2020 10:59 AM    VLDL, calculated 13 06/05/2020 11:27 AM    VLDL Cholesterol Du 13 09/08/2020 10:59 AM    CHOL/HDL Ratio 5.1 (H) 09/14/2016 05:28 AM       Lab Results   Component Value Date/Time    Hemoglobin A1c 6.0 09/14/2016 02:57 PM       Lab Results   Component Value Date/Time    Sodium 138 03/29/2018 10:42 AM    Potassium 5.1 03/29/2018 10:42 AM    Chloride 101 03/29/2018 10:42 AM    CO2 23 03/29/2018 10:42 AM    Glucose 85 03/29/2018 10:42 AM    BUN 17 03/29/2018 10:42 AM Creatinine 0.99 03/29/2018 10:42 AM    BUN/Creatinine ratio 17 03/29/2018 10:42 AM    GFR est AA 89 03/29/2018 10:42 AM    GFR est non-AA 77 03/29/2018 10:42 AM    Calcium 9.4 03/29/2018 10:42 AM    Anion gap 9 09/22/2016 10:34 AM    Bilirubin, total 0.5 06/05/2020 11:27 AM    ALT (SGPT) 35 06/05/2020 11:27 AM    Alk. phosphatase 106 06/05/2020 11:27 AM    Protein, total 7.4 06/05/2020 11:27 AM    Albumin 4.4 06/05/2020 11:27 AM    Globulin 3.7 09/22/2016 10:34 AM    A-G Ratio 1.7 03/29/2018 10:42 AM          Assessment:       ICD-10-CM ICD-9-CM    1. Coronary artery disease involving native coronary artery of native heart without angina pectoris  I25.10 414.01 AMB POC EKG ROUTINE W/ 12 LEADS, INTER & REP   2. Mixed hyperlipidemia  E78.2 272.2    3. S/P CABG x 4  Z95.1 V45.81    4. Pre-operative clearance  Z01.818 V72.84         Plan:     1. Coronary artery disease involving native coronary artery of native heart without angina pectoris  S/p CABG x 4 in 9/2016  Echo done 11/2019 with preserved LVEF 50-55% with mild MR with mild MV prolapse  Without anginal or anginal equivalent symptoms  Continue statin, Zetia, BB and ASA    2. Mixed hyperlipidemia  LDL 46 in 9/2020  Continue statin & Zetia therapy and low fat, low cholesterol diet  Lipids managed by PCP    3. S/P CABG x 4  Hx of CABG x 4 with LIMA to LAD, seq RA to ramus to OM and RCA in 9/2016    4. Pre-operative clearance  May be considered low operative risk from CV standpoint for upcoming surgery. May hold ASA 5-7 days prior to procedure, should resume afterwards. F/u with Dr. Azeem Aquino in 1 year    Sammi Vanessa NP       [facility]    10/26/2020         Patient seen, examined by me personally. Plan discussed as detailed. Agree with note as outlined by  NP. I confirm findings in history and physical exam. No additional findings noted. Agree with plan as outlined above.      Opal Chang MD

## 2020-10-26 ENCOUNTER — OFFICE VISIT (OUTPATIENT)
Dept: CARDIOLOGY CLINIC | Age: 75
End: 2020-10-26
Payer: MEDICARE

## 2020-10-26 VITALS
HEART RATE: 52 BPM | RESPIRATION RATE: 18 BRPM | OXYGEN SATURATION: 100 % | HEIGHT: 69 IN | SYSTOLIC BLOOD PRESSURE: 120 MMHG | WEIGHT: 147.9 LBS | DIASTOLIC BLOOD PRESSURE: 84 MMHG | BODY MASS INDEX: 21.91 KG/M2

## 2020-10-26 DIAGNOSIS — Z95.1 S/P CABG X 4: ICD-10-CM

## 2020-10-26 DIAGNOSIS — E78.2 MIXED HYPERLIPIDEMIA: ICD-10-CM

## 2020-10-26 DIAGNOSIS — I25.10 CORONARY ARTERY DISEASE INVOLVING NATIVE CORONARY ARTERY OF NATIVE HEART WITHOUT ANGINA PECTORIS: Primary | ICD-10-CM

## 2020-10-26 DIAGNOSIS — Z01.818 PRE-OPERATIVE CLEARANCE: ICD-10-CM

## 2020-10-26 PROCEDURE — G8752 SYS BP LESS 140: HCPCS | Performed by: INTERNAL MEDICINE

## 2020-10-26 PROCEDURE — G8754 DIAS BP LESS 90: HCPCS | Performed by: INTERNAL MEDICINE

## 2020-10-26 PROCEDURE — G8536 NO DOC ELDER MAL SCRN: HCPCS | Performed by: INTERNAL MEDICINE

## 2020-10-26 PROCEDURE — G8427 DOCREV CUR MEDS BY ELIG CLIN: HCPCS | Performed by: INTERNAL MEDICINE

## 2020-10-26 PROCEDURE — G8420 CALC BMI NORM PARAMETERS: HCPCS | Performed by: INTERNAL MEDICINE

## 2020-10-26 PROCEDURE — 93000 ELECTROCARDIOGRAM COMPLETE: CPT | Performed by: INTERNAL MEDICINE

## 2020-10-26 PROCEDURE — 3017F COLORECTAL CA SCREEN DOC REV: CPT | Performed by: INTERNAL MEDICINE

## 2020-10-26 PROCEDURE — G8432 DEP SCR NOT DOC, RNG: HCPCS | Performed by: INTERNAL MEDICINE

## 2020-10-26 PROCEDURE — 99214 OFFICE O/P EST MOD 30 MIN: CPT | Performed by: INTERNAL MEDICINE

## 2020-10-26 PROCEDURE — 1101F PT FALLS ASSESS-DOCD LE1/YR: CPT | Performed by: INTERNAL MEDICINE

## 2020-10-26 NOTE — LETTER
10/26/20 Patient: Ida íRos YOB: 1945 Date of Visit: 10/26/2020 Kaelyn Owens MD 
[facility] [address] [address] VIA In Basket Dear Kaelyn Owens MD, Thank you for referring Mr. Ham Hopping to [address] [place] [address] for evaluation. My notes for this consultation are attached. If you have questions, please do not hesitate to call me. I look forward to following your patient along with you. Sincerely, Yuliya Garcia MD

## 2020-10-26 NOTE — PROGRESS NOTES
1. Have you been to the ER, urgent care clinic since your last visit? Hospitalized since your last visit? No    2. Have you seen or consulted any other health care providers outside of the [address] [place] [address] since your last visit? Include any pap smears or colon screening. No    Chief Complaint   Patient presents with    Surgical Clearance     for hernia repair     Patient denies cardiac symptoms. Surgeon:  Dr. Vijay Mishra  Procedure:  Hernia repair  Date:  Nov 9, 2020    Needs recommendation for ASA 81 mg.

## 2020-10-30 NOTE — PERIOP NOTES
[facility]  Ambulatory Surgery Unit  Pre-operative Instructions    Surgery/Procedure Date  11/9            Tentative Arrival Time 1:00pm      1. On the day of your surgery/procedure, please report to the Ambulatory Surgery Unit Registration Desk and sign in at your designated time. The Ambulatory Surgery Unit is located in [facility] on the [facility] side of the \[facility]\"" across from the [address] [place] [address]. Please have all of your health insurance cards and a photo ID. 2. You must have someone with you to drive you home, as you should not drive a car for 24 hours following anesthesia. Please make arrangements for a responsible adult friend or family member to stay with you for at least the first 24 hours after your surgery. 3. Do not have anything to eat or drink (including water, gum, mints, coffee, juice) after 11:59 PM  11/8. This may not apply to medications prescribed by your physician. (Please note below the special instructions with medications to take the morning of surgery, if applicable.)    4. We recommend you do not drink any alcoholic beverages for 24 hours before and after your surgery. 5. Contact your surgeons office for instructions on the following medications: non-steroidal anti-inflammatory drugs (i.e. Advil, Aleve), vitamins, and supplements. (Some surgeons will want you to stop these medications prior to surgery and others may allow you to take them)   **If you are currently taking Plavix, Coumadin, Aspirin and/or other blood-thinning agents, contact your surgeon for instructions. ** Your surgeon will partner with the physician prescribing these medications to determine if it is safe to stop or if you need to continue taking. Please do not stop taking these medications without instructions from your surgeon.     6. In an effort to help prevent surgical site infection, we ask that you shower with an anti-bacterial soap (i.e. Dial/Safeguard, or the soap provided to you at your preadmission testing appointment) for 3 days prior to and on the morning of surgery, using a fresh towel after each shower. (Please begin this process with fresh bed linens.) Do not apply any lotions, powders, or deodorants after the shower on the day of your procedure. If applicable, please do not shave the operative site for 48 hours prior to surgery. 7. Wear comfortable clothes. Wear glasses instead of contacts. Do not bring any jewelry or money (other than copays or fees as instructed). Do not wear make-up, particularly mascara, the morning of your surgery. Do not wear nail polish, particularly if you are having foot /hand surgery. Wear your hair loose or down, no ponytails, buns, adolfo pins or clips. All body piercings must be removed. 8. You should understand that if you do not follow these instructions your surgery may be cancelled. If your physical condition changes (i.e. fever, cold or flu) please contact your surgeon as soon as possible. 9. It is important that you be on time. If a situation occurs where you may be late, or if you have any questions or problems, please call [phone].    10. Your surgery time may be subject to change. You will receive a phone call the day prior to surgery to confirm your arrival time. Special Instructions: Take all medications and inhalers, as prescribed, on the morning of surgery with a sip of water EXCEPT: none      I understand a pre-operative phone call will be made to verify my surgery time. In the event that I am not available, I give permission for a message to be left on my answering service and/or with another person? yes    Reviewed by phone with pt, verbalized understanding.  Pt aware recommendation to self quarantine post covid testing.   ___________________      ___________________      ________________  (Signature of Patient)          (Witness)                   (Date and Time)

## 2020-11-05 ENCOUNTER — HOSPITAL ENCOUNTER (OUTPATIENT)
Dept: PREADMISSION TESTING | Age: 75
Discharge: HOME | End: 2020-11-05
Payer: MEDICARE

## 2020-11-05 PROCEDURE — 87635 SARS-COV-2 COVID-19 AMP PRB: CPT

## 2020-11-06 ENCOUNTER — ANESTHESIA EVENT (OUTPATIENT)
Dept: SURGERY | Age: 75
End: 2020-11-06
Payer: MEDICARE

## 2020-11-06 LAB
HEALTH STATUS, XMCV2T: NORMAL
SARS-COV-2, COV2NT: NOT DETECTED
SOURCE, COVRS: NORMAL
SPECIMEN SOURCE, FCOV2M: NORMAL
SPECIMEN TYPE, XMCV1T: NORMAL

## 2020-11-09 ENCOUNTER — ANESTHESIA (OUTPATIENT)
Dept: SURGERY | Age: 75
End: 2020-11-09
Payer: MEDICARE

## 2020-11-09 ENCOUNTER — HOSPITAL ENCOUNTER (OUTPATIENT)
Age: 75
Setting detail: OUTPATIENT SURGERY
Discharge: HOME | End: 2020-11-09
Attending: SURGERY | Admitting: SURGERY
Payer: MEDICARE

## 2020-11-09 VITALS
TEMPERATURE: 97.9 F | SYSTOLIC BLOOD PRESSURE: 115 MMHG | DIASTOLIC BLOOD PRESSURE: 70 MMHG | HEIGHT: 69 IN | BODY MASS INDEX: 21.33 KG/M2 | HEART RATE: 58 BPM | RESPIRATION RATE: 13 BRPM | OXYGEN SATURATION: 98 % | WEIGHT: 144 LBS

## 2020-11-09 DIAGNOSIS — K40.20 NON-RECURRENT BILATERAL INGUINAL HERNIA WITHOUT OBSTRUCTION OR GANGRENE: Primary | ICD-10-CM

## 2020-11-09 PROCEDURE — 74011000250 HC RX REV CODE- 250: Performed by: SURGERY

## 2020-11-09 PROCEDURE — 74011250637 HC RX REV CODE- 250/637: Performed by: SURGERY

## 2020-11-09 PROCEDURE — 74011250636 HC RX REV CODE- 250/636: Performed by: ANESTHESIOLOGY

## 2020-11-09 PROCEDURE — 77030008606 HC TRCR ENDOSC KII AMR -B: Performed by: SURGERY

## 2020-11-09 PROCEDURE — 77030021678 HC GLIDESCP STAT DISP VERT -B: Performed by: NURSE ANESTHETIST, CERTIFIED REGISTERED

## 2020-11-09 PROCEDURE — 2709999900 HC NON-CHARGEABLE SUPPLY: Performed by: SURGERY

## 2020-11-09 PROCEDURE — 76210000034 HC AMBSU PH I REC 0.5 TO 1 HR: Performed by: SURGERY

## 2020-11-09 PROCEDURE — 76060000061 HC AMB SURG ANES 0.5 TO 1 HR: Performed by: SURGERY

## 2020-11-09 PROCEDURE — 77030026438 HC STYL ET INTUB CARD -A: Performed by: NURSE ANESTHETIST, CERTIFIED REGISTERED

## 2020-11-09 PROCEDURE — C1727 CATH, BAL TIS DIS, NON-VAS: HCPCS | Performed by: SURGERY

## 2020-11-09 PROCEDURE — 76210000050 HC AMBSU PH II REC 0.5 TO 1 HR: Performed by: SURGERY

## 2020-11-09 PROCEDURE — 74011250636 HC RX REV CODE- 250/636: Performed by: NURSE ANESTHETIST, CERTIFIED REGISTERED

## 2020-11-09 PROCEDURE — 74011000250 HC RX REV CODE- 250: Performed by: NURSE ANESTHETIST, CERTIFIED REGISTERED

## 2020-11-09 PROCEDURE — 77030020829: Performed by: SURGERY

## 2020-11-09 PROCEDURE — 74011250637 HC RX REV CODE- 250/637: Performed by: ANESTHESIOLOGY

## 2020-11-09 PROCEDURE — 76030000000 HC AMB SURG OR TIME 0.5 TO 1: Performed by: SURGERY

## 2020-11-09 PROCEDURE — 74011250636 HC RX REV CODE- 250/636: Performed by: SURGERY

## 2020-11-09 PROCEDURE — 49650 LAP ING HERNIA REPAIR INIT: CPT | Performed by: SURGERY

## 2020-11-09 PROCEDURE — 77030019908 HC STETH ESOPH SIMS -A: Performed by: NURSE ANESTHETIST, CERTIFIED REGISTERED

## 2020-11-09 PROCEDURE — C1781 MESH (IMPLANTABLE): HCPCS | Performed by: SURGERY

## 2020-11-09 PROCEDURE — 77030010507 HC ADH SKN DERMBND J&J -B: Performed by: SURGERY

## 2020-11-09 PROCEDURE — 77030031139 HC SUT VCRL2 J&J -A: Performed by: SURGERY

## 2020-11-09 PROCEDURE — 77030018836 HC SOL IRR NACL ICUM -A: Performed by: SURGERY

## 2020-11-09 PROCEDURE — 77030008684 HC TU ET CUF COVD -B: Performed by: NURSE ANESTHETIST, CERTIFIED REGISTERED

## 2020-11-09 DEVICE — BARD MESH, 6" X 6" (15 CM X 15 CM)
Type: IMPLANTABLE DEVICE | Site: ABDOMEN | Status: FUNCTIONAL
Brand: BARD

## 2020-11-09 RX ORDER — MORPHINE SULFATE 10 MG/ML
2 INJECTION, SOLUTION INTRAMUSCULAR; INTRAVENOUS
Status: DISCONTINUED | OUTPATIENT
Start: 2020-11-09 | End: 2020-11-09 | Stop reason: HOSPADM

## 2020-11-09 RX ORDER — SODIUM CHLORIDE 0.9 % (FLUSH) 0.9 %
5-40 SYRINGE (ML) INJECTION AS NEEDED
Status: DISCONTINUED | OUTPATIENT
Start: 2020-11-09 | End: 2020-11-09 | Stop reason: HOSPADM

## 2020-11-09 RX ORDER — BUPIVACAINE HYDROCHLORIDE AND EPINEPHRINE 5; 5 MG/ML; UG/ML
INJECTION, SOLUTION EPIDURAL; INTRACAUDAL; PERINEURAL AS NEEDED
Status: DISCONTINUED | OUTPATIENT
Start: 2020-11-09 | End: 2020-11-09 | Stop reason: HOSPADM

## 2020-11-09 RX ORDER — IBUPROFEN 600 MG/1
600 TABLET ORAL
Qty: 30 TAB | Refills: 0 | Status: SHIPPED | OUTPATIENT
Start: 2020-11-09 | End: 2020-11-19

## 2020-11-09 RX ORDER — SODIUM CHLORIDE, SODIUM LACTATE, POTASSIUM CHLORIDE, CALCIUM CHLORIDE 600; 310; 30; 20 MG/100ML; MG/100ML; MG/100ML; MG/100ML
25 INJECTION, SOLUTION INTRAVENOUS CONTINUOUS
Status: DISCONTINUED | OUTPATIENT
Start: 2020-11-09 | End: 2020-11-09 | Stop reason: HOSPADM

## 2020-11-09 RX ORDER — ROCURONIUM BROMIDE 10 MG/ML
INJECTION, SOLUTION INTRAVENOUS AS NEEDED
Status: DISCONTINUED | OUTPATIENT
Start: 2020-11-09 | End: 2020-11-09 | Stop reason: HOSPADM

## 2020-11-09 RX ORDER — ACETAMINOPHEN 500 MG
1000 TABLET ORAL ONCE
Status: COMPLETED | OUTPATIENT
Start: 2020-11-09 | End: 2020-11-09

## 2020-11-09 RX ORDER — FENTANYL CITRATE 50 UG/ML
INJECTION, SOLUTION INTRAMUSCULAR; INTRAVENOUS AS NEEDED
Status: DISCONTINUED | OUTPATIENT
Start: 2020-11-09 | End: 2020-11-09 | Stop reason: HOSPADM

## 2020-11-09 RX ORDER — ACETAMINOPHEN 500 MG
TABLET ORAL
Status: DISCONTINUED
Start: 2020-11-09 | End: 2020-11-09 | Stop reason: HOSPADM

## 2020-11-09 RX ORDER — SODIUM CHLORIDE 0.9 % (FLUSH) 0.9 %
5-40 SYRINGE (ML) INJECTION EVERY 8 HOURS
Status: DISCONTINUED | OUTPATIENT
Start: 2020-11-09 | End: 2020-11-09 | Stop reason: HOSPADM

## 2020-11-09 RX ORDER — FENTANYL CITRATE 50 UG/ML
25 INJECTION, SOLUTION INTRAMUSCULAR; INTRAVENOUS
Status: DISCONTINUED | OUTPATIENT
Start: 2020-11-09 | End: 2020-11-09 | Stop reason: HOSPADM

## 2020-11-09 RX ORDER — LIDOCAINE HYDROCHLORIDE 10 MG/ML
0.1 INJECTION, SOLUTION EPIDURAL; INFILTRATION; INTRACAUDAL; PERINEURAL AS NEEDED
Status: DISCONTINUED | OUTPATIENT
Start: 2020-11-09 | End: 2020-11-09 | Stop reason: HOSPADM

## 2020-11-09 RX ORDER — PROPOFOL 10 MG/ML
INJECTION, EMULSION INTRAVENOUS AS NEEDED
Status: DISCONTINUED | OUTPATIENT
Start: 2020-11-09 | End: 2020-11-09 | Stop reason: HOSPADM

## 2020-11-09 RX ORDER — ONDANSETRON 2 MG/ML
INJECTION INTRAMUSCULAR; INTRAVENOUS AS NEEDED
Status: DISCONTINUED | OUTPATIENT
Start: 2020-11-09 | End: 2020-11-09 | Stop reason: HOSPADM

## 2020-11-09 RX ORDER — PROPOFOL 10 MG/ML
INJECTION, EMULSION INTRAVENOUS
Status: DISCONTINUED | OUTPATIENT
Start: 2020-11-09 | End: 2020-11-09 | Stop reason: HOSPADM

## 2020-11-09 RX ORDER — POLYETHYLENE GLYCOL 3350 17 G/17G
17 POWDER, FOR SOLUTION ORAL 2 TIMES DAILY
Qty: 60 PACKET | Refills: 0 | Status: SHIPPED | OUTPATIENT
Start: 2020-11-09 | End: 2020-12-09

## 2020-11-09 RX ORDER — MIDAZOLAM HYDROCHLORIDE 1 MG/ML
INJECTION, SOLUTION INTRAMUSCULAR; INTRAVENOUS AS NEEDED
Status: DISCONTINUED | OUTPATIENT
Start: 2020-11-09 | End: 2020-11-09 | Stop reason: HOSPADM

## 2020-11-09 RX ORDER — SUCCINYLCHOLINE CHLORIDE 20 MG/ML
INJECTION INTRAMUSCULAR; INTRAVENOUS AS NEEDED
Status: DISCONTINUED | OUTPATIENT
Start: 2020-11-09 | End: 2020-11-09 | Stop reason: HOSPADM

## 2020-11-09 RX ORDER — OXYCODONE AND ACETAMINOPHEN 5; 325 MG/1; MG/1
1 TABLET ORAL
Status: DISCONTINUED | OUTPATIENT
Start: 2020-11-09 | End: 2020-11-09 | Stop reason: HOSPADM

## 2020-11-09 RX ORDER — DIPHENHYDRAMINE HYDROCHLORIDE 50 MG/ML
12.5 INJECTION, SOLUTION INTRAMUSCULAR; INTRAVENOUS AS NEEDED
Status: DISCONTINUED | OUTPATIENT
Start: 2020-11-09 | End: 2020-11-09 | Stop reason: HOSPADM

## 2020-11-09 RX ORDER — DEXAMETHASONE SODIUM PHOSPHATE 4 MG/ML
INJECTION, SOLUTION INTRA-ARTICULAR; INTRALESIONAL; INTRAMUSCULAR; INTRAVENOUS; SOFT TISSUE AS NEEDED
Status: DISCONTINUED | OUTPATIENT
Start: 2020-11-09 | End: 2020-11-09 | Stop reason: HOSPADM

## 2020-11-09 RX ORDER — EPHEDRINE SULFATE/0.9% NACL/PF 50 MG/5 ML
SYRINGE (ML) INTRAVENOUS AS NEEDED
Status: DISCONTINUED | OUTPATIENT
Start: 2020-11-09 | End: 2020-11-09 | Stop reason: HOSPADM

## 2020-11-09 RX ORDER — HYDROMORPHONE HYDROCHLORIDE 1 MG/ML
.2-.5 INJECTION, SOLUTION INTRAMUSCULAR; INTRAVENOUS; SUBCUTANEOUS ONCE
Status: DISCONTINUED | OUTPATIENT
Start: 2020-11-09 | End: 2020-11-09 | Stop reason: HOSPADM

## 2020-11-09 RX ORDER — OXYCODONE AND ACETAMINOPHEN 5; 325 MG/1; MG/1
1 TABLET ORAL
Qty: 15 TAB | Refills: 0 | Status: SHIPPED | OUTPATIENT
Start: 2020-11-09 | End: 2020-11-14

## 2020-11-09 RX ORDER — LIDOCAINE HYDROCHLORIDE 20 MG/ML
INJECTION, SOLUTION EPIDURAL; INFILTRATION; INTRACAUDAL; PERINEURAL AS NEEDED
Status: DISCONTINUED | OUTPATIENT
Start: 2020-11-09 | End: 2020-11-09 | Stop reason: HOSPADM

## 2020-11-09 RX ADMIN — FENTANYL CITRATE 50 MCG: 50 INJECTION, SOLUTION INTRAMUSCULAR; INTRAVENOUS at 14:59

## 2020-11-09 RX ADMIN — Medication 10 MG: at 14:23

## 2020-11-09 RX ADMIN — MIDAZOLAM HYDROCHLORIDE 1 MG: 1 INJECTION, SOLUTION INTRAMUSCULAR; INTRAVENOUS at 14:16

## 2020-11-09 RX ADMIN — SUGAMMADEX 200 MG: 100 INJECTION, SOLUTION INTRAVENOUS at 14:58

## 2020-11-09 RX ADMIN — ONDANSETRON HYDROCHLORIDE 4 MG: 2 INJECTION, SOLUTION INTRAMUSCULAR; INTRAVENOUS at 14:38

## 2020-11-09 RX ADMIN — ROCURONIUM BROMIDE 10 MG: 10 INJECTION INTRAVENOUS at 14:23

## 2020-11-09 RX ADMIN — SUCCINYLCHOLINE CHLORIDE 140 MG: 20 INJECTION, SOLUTION INTRAMUSCULAR; INTRAVENOUS at 14:23

## 2020-11-09 RX ADMIN — FENTANYL CITRATE 25 MCG: 50 INJECTION, SOLUTION INTRAMUSCULAR; INTRAVENOUS at 15:23

## 2020-11-09 RX ADMIN — FENTANYL CITRATE 50 MCG: 50 INJECTION, SOLUTION INTRAMUSCULAR; INTRAVENOUS at 14:23

## 2020-11-09 RX ADMIN — ROCURONIUM BROMIDE 10 MG: 10 INJECTION INTRAVENOUS at 14:38

## 2020-11-09 RX ADMIN — LIDOCAINE HYDROCHLORIDE 60 MG: 20 INJECTION, SOLUTION EPIDURAL; INFILTRATION; INTRACAUDAL; PERINEURAL at 14:23

## 2020-11-09 RX ADMIN — DEXAMETHASONE SODIUM PHOSPHATE 4 MG: 4 INJECTION, SOLUTION INTRAMUSCULAR; INTRAVENOUS at 14:38

## 2020-11-09 RX ADMIN — MEPERIDINE HYDROCHLORIDE 12.5 MG: 25 INJECTION, SOLUTION INTRAMUSCULAR; INTRAVENOUS; SUBCUTANEOUS at 15:25

## 2020-11-09 RX ADMIN — Medication 3 AMPULE: at 13:04

## 2020-11-09 RX ADMIN — Medication 1000 MG: at 13:14

## 2020-11-09 RX ADMIN — ROCURONIUM BROMIDE 10 MG: 10 INJECTION INTRAVENOUS at 14:32

## 2020-11-09 RX ADMIN — WATER 2 G: 1 INJECTION INTRAMUSCULAR; INTRAVENOUS; SUBCUTANEOUS at 14:30

## 2020-11-09 RX ADMIN — Medication 10 MG: at 14:33

## 2020-11-09 RX ADMIN — PROPOFOL 120 MG: 10 INJECTION, EMULSION INTRAVENOUS at 14:23

## 2020-11-09 RX ADMIN — PROPOFOL 75 MCG/KG/MIN: 10 INJECTION, EMULSION INTRAVENOUS at 14:25

## 2020-11-09 RX ADMIN — SODIUM CHLORIDE, SODIUM LACTATE, POTASSIUM CHLORIDE, AND CALCIUM CHLORIDE 25 ML/HR: 600; 310; 30; 20 INJECTION, SOLUTION INTRAVENOUS at 13:10

## 2020-11-09 RX ADMIN — Medication 10 MG: at 14:28

## 2020-11-09 RX ADMIN — FENTANYL CITRATE 25 MCG: 50 INJECTION, SOLUTION INTRAMUSCULAR; INTRAVENOUS at 15:30

## 2020-11-09 NOTE — ANESTHESIA PREPROCEDURE EVALUATION
Anesthetic History   No history of anesthetic complications            Review of Systems / Medical History  Patient summary reviewed, nursing notes reviewed and pertinent labs reviewed    Pulmonary                Comments: Former smoker   Neuro/Psych             Comments:   Syncope   Cardiovascular    Hypertension          CAD, CABG (09/2016) and hyperlipidemia    Exercise tolerance: >4 METS  Comments: 11/2019    Ejection fraction is 51 - 55%  Mild MR    MV prolapse   GI/Hepatic/Renal  Within defined limits              Endo/Other  Within defined limits           Other Findings            Physical Exam    Airway  Mallampati: II  TM Distance: 4 - 6 cm  Neck ROM: normal range of motion   Mouth opening: Normal     Cardiovascular  Regular rate and rhythm,  S1 and S2 normal,  no murmur, click, rub, or gallop  Rhythm: regular  Rate: normal         Dental  No notable dental hx       Pulmonary  Breath sounds clear to auscultation               Abdominal  GI exam deferred       Other Findings            Anesthetic Plan    ASA: 2  Anesthesia type: general    Monitoring Plan: BIS      Induction: Intravenous  Anesthetic plan and risks discussed with: Patient

## 2020-11-09 NOTE — PERIOP NOTES
Permission received to review discharge instructions and discuss private health information with wife Myrna Pickens. Patient states that wife will be with them for at least 24 hours following today's procedure. Warming blanket applied and set to appropriate temp for patient.

## 2020-11-09 NOTE — PERIOP NOTES
1514-Pt. To pacu, sleepy but arousable. Vss. Incision to abdomen intact. 1523-Pt. C/o pain to abdomen level 8/10. Medicated w/fentanyl 25mcg iv and12.5mg demerol at 1525. Will monitor. 1530-Pt. States pain is still 8/10. Medicated w/fentanyl 25mcg iv. Will monitor. 1545-Pt. States pain is now 4/10. Will monitor. 1555-Discharge instructions reviewed w/pts. Wife. She verbalized understanding. 1633-Pt. Stated ready for discharge. Vss. States pain to abdomen is 4/10. States is tolerable. Pt. Assisted to bathroom and voided. Pt discharged via wheelchair to car, accompanied by RN. Pt discharged awake and alert, respirations equal and unlabored, skin warm, dry, and intact. Pt and family members' questions and concerns addressed prior to discharge.

## 2020-11-09 NOTE — BRIEF OP NOTE
Brief Postoperative Note    Patient: Jeanne Angel  YOB: 1945  MRN: [id number]    Date of Procedure: 11/9/2020     Pre-Op Diagnosis: BILATERAL INGUINAL HERNIA    Post-Op Diagnosis: Same as preoperative diagnosis. Procedure(s):  LAPAROSCOPIC TOTALLY EXTRA PERITONEAL, PRE PERITONEAL, BILATERAL INGUINAL HERNIA REPAIR WITH MESH    Surgeon(s):  Ladonna Bai MD    Surgical Assistant: Surg Asst-1: Ladi Alvarado    Anesthesia: General     Estimated Blood Loss (mL): Minimal    Complications: None    Specimens: * No specimens in log *     Implants:   Implant Name Type Inv.  Item Serial No.  Lot No. LRB No. Used Action   MESH MARLEX [id number] HERNIA - SN/A  MESH MARLEX [id number] HERNIA N/A BARD DAVOL_WD [id number] N/A 1 Implanted       Drains:   [REMOVED] Orogastric Tube 09/15/16 (Removed)       Zee Springer #4 09/15/16 Mid Chest (Removed)       Findings: bilateral direct and indirect    Electronically Signed by Harry Blandon MD on 11/9/2020 at 3:01 PM

## 2020-11-09 NOTE — ANESTHESIA POSTPROCEDURE EVALUATION
Procedure(s):  LAPAROSCOPIC TOTALLY EXTRA PERITONEAL, PRE PERITONEAL, BILATERAL INGUINAL HERNIA REPAIR WITH MESH. general    Anesthesia Post Evaluation        Patient location during evaluation: PACU  Note status: Adequate. Level of consciousness: responsive to verbal stimuli and sleepy but conscious  Pain management: satisfactory to patient  Airway patency: patent  Anesthetic complications: no  Cardiovascular status: acceptable  Respiratory status: acceptable  Hydration status: acceptable  Comments: +Post-Anesthesia Evaluation and Assessment    Patient: Radha Flores MRN: [id number]  SSN: [id number]   YOB: 1945  Age: 69 y.o. Sex: male      Cardiovascular Function/Vital Signs    /85 (BP 1 Location: Right arm, BP Patient Position: At rest)   Pulse 62   Temp 36.6 °C (97.9 °F)   Resp 15   Ht 5' 9\" (1.753 m)   Wt 65.3 kg (144 lb)   SpO2 92%   BMI 21.27 kg/m²     Patient is status post Procedure(s):  LAPAROSCOPIC TOTALLY EXTRA PERITONEAL, PRE PERITONEAL, BILATERAL INGUINAL HERNIA REPAIR WITH MESH. Nausea/Vomiting: Controlled. Postoperative hydration reviewed and adequate. Pain:  Pain Scale 1: Numeric (0 - 10) (11/09/20 1530)  Pain Intensity 1: 6 (11/09/20 1530)   Managed. Neurological Status:   Neuro (WDL): Exceptions to WDL (11/09/20 1530)   At baseline. Mental Status and Level of Consciousness: Arousable. Pulmonary Status:   O2 Device: Nasal cannula (11/09/20 1530)   Adequate oxygenation and airway patent. Complications related to anesthesia: None    Post-anesthesia assessment completed. No concerns.     Signed By: Zbigniew Delarosa MD    11/9/2020  Post anesthesia nausea and vomiting:  controlled      INITIAL Post-op Vital signs:   Vitals Value Taken Time   /85 11/9/2020  3:30 PM   Temp 36.6 °C (97.9 °F) 11/9/2020  3:30 PM   Pulse 59 11/9/2020  3:46 PM   Resp 14 11/9/2020  3:46 PM   SpO2 94 % 11/9/2020  3:46 PM   Vitals shown include unvalidated device data.

## 2020-11-09 NOTE — PERIOP NOTES
Jeanne Diomedesnito  1945  [id number]    Situation:  Verbal report given from: Jojo Hawk CRNA, Franck Boo RN  Procedure: Procedure(s):  LAPAROSCOPIC TOTALLY EXTRA PERITONEAL, PRE PERITONEAL, BILATERAL INGUINAL HERNIA REPAIR WITH MESH    Background:    Preoperative diagnosis: BILATERAL INGUINAL HERNIA    Postoperative diagnosis: BILATERAL INGUINAL HERNIA    :  Dr. Reyes Gust    Assistant(s): Circ-1: Cat Christiansen RN  Circ-Relief:  Petty John RN  Scrub Tech-1: Luann Ledesma; Carmen Parish  Surg Asst-1: Ladi Alvarado    Specimens: * No specimens in log *    Assessment:  Intra-procedure medications         Anesthesia gave intra-procedure sedation and medications, see anesthesia flow sheet     Intravenous fluids: LR@ KVO     Vital signs stable       Recommendation:    Permission to share finding with wife : yes

## 2020-11-09 NOTE — DISCHARGE INSTRUCTIONS
Discharge Instructions:  Hernia Repair    Dr. José Miguel Lazaro    Call for appointment for follow up in 2 weeks [phone]    Activity:    Walk regularly. No lifting more than 10 pounds for 6 weeks. Light aerobic activity is okay when you feel up to it. You may resume driving in five days unless still requiring narcotics for pain. Work:    You may return to work in 2 or 3 weeks to light activity. No lifting more than 10 pounds for 6 weeks. Diet:    You may resume normal diet after 24 hours. Anesthesia and narcotics may cause nausea and vomiting. If persistent please call the office. Wound Care: You have a special dressing called Dermabond. It is okay to shower and let the water run over the incisions but do not scrub the area or soak in a tub. If you have a small amount of drainage you may place a dry bandage over the wound and change it daily. If you experience a lot of drainage, develop redness around the wound, or a fever over 101 F occurs please call the office. May use ice over incision for comfort. Medications:    Resume home medications as indicated on the Medical Reconciliation form. Aspirin and Coumadin can be restarted immediately if you were taking them preoperatively. If taking Plavix do not restart it until post operative day 2. Pain medications:  Non steroidal antiinflammatories seem to work best for post surgical pain. Try these first as prescribed. A narcotic prescription will also be given for breakthrough pain. Over the counter stool softeners and laxatives may be used if needed. Do not hesitate to call with questions or concerns.    >>>You received Tylenol 1000mg during your surgery, you may take tylenol (or pain medication containing Tylenol or Acetaminophen) in 6 hours at 7:15pm.<<<    DO NOT TAKE TYLENOL/ACETAMINOPHEN WITH PERCOCET, LORTAB, [address] OR VICODEN.     TAKE NARCOTIC PAIN MEDICATIONS WITH FOOD     Narcotics tend to be constipating, we suggest taking a stool softener such as Colace or Miralax (follow package instructions). DO NOT DRIVE WHILE TAKING NARCOTIC PAIN MEDICATIONS. DO NOT TAKE SLEEPING MEDICATIONS OR ANTIANXIETY MEDICATIONS WHILE TAKING NARCOTIC PAIN MEDICATIONS,  ESPECIALLY THE NIGHT OF ANESTHESIA! CPAP PATIENTS BE SURE TO WEAR MACHINE WHENEVER NAPPING OR SLEEPING! DISCHARGE SUMMARY from Nurse    The following personal items collected during your admission are returned to you:   Dental Appliance: Dental Appliances: None  Vision: Visual Aid: Glasses, With patient(placed in case in belonging bag)  Hearing Aid:    Jewelry: Jewelry: None  Clothing: Clothing: Shirt, Pants, Footwear, With patient  Other Valuables: Other Valuables: Eyeglasses, With patient  Valuables sent to safe:        PATIENT INSTRUCTIONS:    After General Anesthesia or Intravenous Sedation, for 24 hours or while taking prescription Narcotics:        Someone should be with you for the next 24 hours. For your own safety, a responsible adult must drive you home. · Limit your activities  · Recommended activity: Rest today, up with assistance today. Do not climb stairs or shower unattended for the next 24 hours. · Please start with a soft bland diet and advance as tolerated (no nausea) to regular diet. · If you have a sore throat you should try the following: fluids, warm salt water gargles, or throat lozenges. If it does not improve after several days please follow up with your primary physician. · Do not drive and operate hazardous machinery  · Do not make important personal or business decisions  · Do  not drink alcoholic beverages  · If you have not urinated within 8 hours after discharge, please contact your surgeon on call.     Report the following to your surgeon:  · Excessive pain, swelling, redness or odor of or around the surgical area  · Temperature over 100.5  · Nausea and vomiting lasting longer than 4 hours or if unable to take medications  · Any signs of decreased circulation or nerve impairment to extremity: change in color, persistent  numbness, tingling, coldness or increase pain      · You will receive a Post Operative Call from one of the Recovery Room Nurses on the day after your surgery to check on you. It is very important for us to know how you are recovering after your surgery. If you have an issue or need to speak with someone, please call your surgeon, do not wait for the post operative call. · You may receive an e-mail or letter in the mail from [employer] regarding your experience with us in the Ambulatory Surgery Unit. Your feedback is valuable to us and we appreciate your participation in the survey. · If the above instructions are not adequate or you are having problems after your surgery, call the physician at their office number. · We wish you a speedy recovery ? What to do at Home:      *  Please give a list of your current medications to your Primary Care Provider. *  Please update this list whenever your medications are discontinued, doses are      changed, or new medications (including over-the-counter products) are added. *  Please carry medication information at all times in case of emergency situations. If you have not received your influenza and/or pneumococcal vaccine, please follow up with your primary care physician. The discharge information has been reviewed with the patient and caregiver. The patient and caregiver verbalized understanding. TO PREVENT AN INFECTION      1. [address] YOUR HANDS     To prevent infection, good handwashing is the most important thing you or your caregiver can do.  Wash your hands with soap and water or use the hand  we gave you before you touch any wounds. 2. SHOWER     Use the antibacterial soap we gave you when you take a shower.  Shower with this soap until your wounds are healed.        To reach all areas of your body, you may need someone to help you.      Dont forget to clean your belly button with every shower. 3.  USE CLEAN SHEETS     Use freshly cleaned sheets on your bed after surgery.  To keep the surgery site clean, do not allow pets to sleep with you while your wound is still healing. 4. STOP SMOKING     Stop smoking, or at least cut back on smoking     Smoking slows your healing. 5.  CONTROL YOUR BLOOD SUGAR     High blood sugars slow wound healing.  If you are diabetic, control your blood sugar levels before and after your surgery. [place] THROMBOSIS AND PULMONARY EMBOLUS    SURGICAL PATIENTS  Surgical patients are the #1 risk for DVT and PE. WHAT IS DVT? WHAT IS PE?  DVT is a serious condition where blood clots develop deep in the veins of the legs. PE occurs when a blood clot breaks loose from the wall of a vein and travels to the lungs blocking the pulmonary artery or one of its branches impairing blood flow from the heart, which could result in death.   RISK FACTORS   Surgery lasting longer than 45 minutes   History of inflammatory bowel disease   Oral contraceptive or hormone replacement therapy   Immobilization   Varicose veins / swollen legs   Smoking    CHF / Acute MI / Irregular heart beat   Family history of thrombosis   General anesthesia greater than 2 hours   Obesity   Infection of less than one month   Less than 1 month postpartum   COPD / Pneumonia   Arthroscopic surgery   Malignancy / cancer   Spine surgery   Blood abnormalities   Stroke / Paralysis / Coma    SIGNS AND SYMPTOMS OF DEEP VEIN TROMBOSIS   -  ER VISIT  Usually occurs in one leg, above or below the knee   Swelling - one calf or thigh may be larger than the other   Feeling increased warmth in the area of the leg that is swollen or painful   Leg pain, which may increase when standing or walking   Swelling along the vein of the leg   When swollen areas is pressed with a finger, a depression may remain   Tenderness of the leg that may be confined to one area   Change in leg color (bluish or red)    SIGNS AND SYMPTOMS OF PULMONARY EMBOLUS  - 911 CALL   Chest pain that gets worse with deep breath, coughing or chest movement   Coughing up blood   Sweating   Shortness of breath or difficulty breathing   Rapid heart beat   Lightheadedness    HOW TO REDUCE THE POSSIBLE RISK OF DVT   Exercise - simple activities as rotating ankles and wrists, wiggling toes and fingers, tightening and relaxing muscles in calves and thighs promotes circulation while recovering from surgery. Please do these exercises every hour during waking hours   Take mediation as prescribed by your physician (Lovenox, Coumadin, Aspirin)   Resume your normal activities as soon as your doctor advises you to do so.  Remember, when traveling, to [place] your legs frequently.         PATIENTS WHO BELIEVE THEY MAY BE EXPERIENCING SIGNS AND SYMPTOMS OF DVT OR PE SHOULD SEEK MEDICAL HELP IMMEDIATELY

## 2020-11-09 NOTE — INTERVAL H&P NOTE
Update History & Physical    The Patient's History and Physical of October 14, 2020 was reviewed with the patient and I examined the patient. There was no change. The surgical site was confirmed by the patient and me. Plan:  The risk, benefits, expected outcome, and alternative to the recommended procedure have been discussed with the patient. Patient understands and wants to proceed with the procedure.     Electronically signed by Dylon Marcial MD on 11/9/2020 at 12:39 PM

## 2020-11-10 NOTE — OP NOTES
[address]  OPERATIVE REPORT    Name:  Alessandro Lei  MR#:  [id number]  :  1945  ACCOUNT #:  [de-identified]  DATE OF SERVICE:  2020    PREOPERATIVE DIAGNOSIS:  Bilateral inguinal hernia. POSTOPERATIVE DIAGNOSIS:  Bilateral inguinal hernia. PROCEDURE PERFORMED:  Laparoscopic total totally extraperitoneal, preperitoneal bilateral inguinal hernia repair with mesh. SURGEON:  Yenni Polanco MD    ASSISTANT:  Sindi Ghotra    ANESTHESIA:  General.    COMPLICATIONS:  None. SPECIMENS REMOVED:  None. IMPLANTS:  Bard Davol Marlex mesh 6 x 6 inches, lot #[id number], it was cut in half to create two 3 x 6 inch pieces of mesh. ESTIMATED BLOOD LOSS:  Minimal.    FINDINGS:  Bilateral direct and indirect defects. BRIEF HISTORY:  The patient is a pleasant 70-year-old gentleman with symptomatic hernia. Options were discussed, elected to undergo repair. He understands the risks and benefits and wished to proceed. These are outlined in my office notes. PROCEDURE:  The patient was taken to the operating room, placed on the operating table in the supine position, underwent general anesthesia, and the arms were tucked and padded at the side and the abdomen was prepped and draped in usual sterile fashion. After appropriate time-out and antibiotics were given, 0.5% Marcaine with epinephrine was infiltrated into the skin and subcutaneous tissues 1 cm medially and 1 cm superiorly to the anterior superior iliac crest on each side for regional nerve block as well as inferior to the umbilicus. An 1 cm incision was made inferior to the umbilicus and subcutaneous tissue was dissected out bluntly. Electrocautery was used to go through the anterior rectus sheath just to left of midline and the preperitoneal space entered. The rectus muscle was then mobilized out laterally.   A code-laboration PDB balloon dissecting system was inserted in the preperitoneal space and distended under direct visualization. This gave good dissection of the tissue planes and this was removed and a STB structural balloon was inserted in. Maximum of 15 mmHg pressure gave good visualization of the preperitoneal cavity. A 5 mm trocar was placed in the lower midline and one in the left lower quadrant, this was subsequently dissected out both sides. There were indirect defects on both sides that were dissected away from the cord structures as well as a direct defect on both sides. Once that was all completed, then we brought in a piece of Marlex mesh up on the field, 6 x 6 inches and cut in half, created two 3 x 6 inches pieces of mesh. A slit was created one-third of the way down the long end. Each piece was subsequently rolled up and inserted in the preperitoneal space with the long end medially and the tail laterally. Utilizing [employer], we tacked it inferior to the pubis and down the Reinaldo's ligament and the shelving edge of the inguinal ligament and anteriorly to  the intraabdominal musculature. We wrapped around the cord structures and tacked it back to itself to create a snug, but not tight internal ring on each side. Once that was completed on both sides, CO2 was allowed to evacuate from the preperitoneal space and there was no evidence of any bleeding and then the trocar was removed. Interrupted 0 Vicryl were then used to reapproximate the fascia at the infraumbilical trocar site. More Marcaine was infiltrated around the fascia in that location. Then running 4-0 Vicryl were used to close the incisions and Dermabond dressings applied. Upon completion of the operation, the needle, sponge and instrument counts were correct x2. The patient had tolerated the procedure well, and was extubated and brought to recovery room.         Antoinette Ramirez MD [id number]  D:  11/09/2020 15:06  T:  11/09/2020 22:25  JOB #:  [id number]  CC:  MD Kristie Lee MD

## 2020-11-24 ENCOUNTER — OFFICE VISIT (OUTPATIENT)
Dept: SURGERY | Age: 75
End: 2020-11-24
Payer: MEDICARE

## 2020-11-24 VITALS
RESPIRATION RATE: 18 BRPM | BODY MASS INDEX: 21.95 KG/M2 | HEIGHT: 69 IN | TEMPERATURE: 97.2 F | WEIGHT: 148.2 LBS | SYSTOLIC BLOOD PRESSURE: 120 MMHG | OXYGEN SATURATION: 100 % | DIASTOLIC BLOOD PRESSURE: 83 MMHG | HEART RATE: 55 BPM

## 2020-11-24 DIAGNOSIS — Z09 POSTOPERATIVE EXAMINATION: Primary | ICD-10-CM

## 2020-11-24 PROCEDURE — 99024 POSTOP FOLLOW-UP VISIT: CPT | Performed by: SURGERY

## 2020-11-24 NOTE — PROGRESS NOTES
Chief Complaint   Patient presents with    Surgical Follow-up     Hernia repair 11/09/20       1. Have you been to the ER, urgent care clinic since your last visit? Hospitalized since your last visit? No    2. Have you seen or consulted any other health care providers outside of the [address] [place] [address] since your last visit? Include any pap smears or colon screening.  No

## 2020-11-24 NOTE — PROGRESS NOTES
Surgery  Follow up  Procedure: Lap TEP BIH with mesh  OR date:  11/9/2020  Path:  none    S I feel good except some left orchalgia    Visit Vitals  /83 (BP 1 Location: Right arm, BP Patient Position: Sitting)   Pulse (!) 55   Temp 97.2 °F (36.2 °C) (Oral)   Resp 18   Ht 5' 9\" (1.753 m)   Wt 67.2 kg (148 lb 3.2 oz)   SpO2 100%   BMI 21.89 kg/m²       O Incisions healing well without infection   No signs of hernia    A/P Doing well   Some left orchalgia      RTC 6 weeks or prn    Chela Stack MD FACS

## 2020-12-31 RX ORDER — METOPROLOL TARTRATE 25 MG/1
12.5 TABLET, FILM COATED ORAL 2 TIMES DAILY
Qty: 90 TAB | Refills: 3 | Status: SHIPPED | OUTPATIENT
Start: 2020-12-31 | End: 2021-12-01

## 2021-04-05 RX ORDER — EZETIMIBE 10 MG/1
10 TABLET ORAL
Qty: 90 TAB | Refills: 3 | Status: SHIPPED | OUTPATIENT
Start: 2021-04-05 | End: 2022-02-22

## 2021-07-14 ENCOUNTER — VIRTUAL VISIT (OUTPATIENT)
Dept: INTERNAL MEDICINE CLINIC | Age: 76
End: 2021-07-14
Payer: MEDICARE

## 2021-07-14 DIAGNOSIS — F32.A ANXIETY AND DEPRESSION: Primary | ICD-10-CM

## 2021-07-14 DIAGNOSIS — F41.9 ANXIETY AND DEPRESSION: Primary | ICD-10-CM

## 2021-07-14 PROCEDURE — 99213 OFFICE O/P EST LOW 20 MIN: CPT | Performed by: FAMILY MEDICINE

## 2021-07-14 RX ORDER — BUPROPION HYDROCHLORIDE 150 MG/1
150 TABLET ORAL
Qty: 30 TABLET | Refills: 2 | Status: SHIPPED | OUTPATIENT
Start: 2021-07-14 | End: 2021-08-10 | Stop reason: SDUPTHER

## 2021-07-14 RX ORDER — BUSPIRONE HYDROCHLORIDE 7.5 MG/1
7.5 TABLET ORAL 3 TIMES DAILY
Qty: 30 TABLET | Refills: 1 | Status: SHIPPED | OUTPATIENT
Start: 2021-07-14 | End: 2021-08-09 | Stop reason: SDUPTHER

## 2021-07-14 NOTE — PROGRESS NOTES
This is an established visit conducted via telemedicine. The patient has been instructed that this meets HIPAA criteria and acknowledges and agrees to this method of visitation. Identified pt with two pt identifiers(name and ). Chief Complaint   Patient presents with    Depression    Anxiety       Health Maintenance Due   Topic Date Due    Hepatitis C Test  Never done    COVID-19 Vaccine (1) Never done    DTaP/Tdap/Td  (1 - Tdap) Never done    Colorectal Screening  Never done    Shingles Vaccine (1 of 2) Never done    Pneumococcal Vaccine (1 of 1 - PPSV23) Never done    Annual Well Visit  2018       Mr. Ramandeep Melchor has a reminder for a \"due or due soon\" health maintenance. I have asked that he discuss this further with his primary care provider for follow-up on this health maintenance.

## 2021-07-14 NOTE — PROGRESS NOTES
Jeronimo Merrill is a 70 y.o. male who was seen by synchronous (real-time) audio-video technology on 7/14/2021 for Depression and Anxiety        Assessment & Plan:   Diagnoses and all orders for this visit:    1. Anxiety and depression  -     buPROPion XL (WELLBUTRIN XL) 150 mg tablet; Take 1 Tablet by mouth every morning.  -     busPIRone (BUSPAR) 7.5 mg tablet; Take 1 Tablet by mouth three (3) times daily. 1. Anxiety and depression   I will start him on Wellbutrin XL and Buspar to help alleviate the anxiety of depression. Prescribed Wellbutrin  mg tablet and Buspar 7.5 mg tablet. I offered to provide a referral to behavioral health. I requested the pt call the office or [address] me to let me know how he is doing on this new medication. We will follow-up in four weeks. I spent at least 15 minutes on this visit with this established patient. Subjective:     Pt presents today virtually for c/o anxiety and depression. Pt's daughter made the appt for him and helps provide information. Anxiety & Depression: Pt's daughter reports he has struggled with this anxiety and depression throughout his life. She notes he is feeling more anxious and depressed d/t recent events. Pt would like to be started on something to help with these feelings. He indicates not wanting to be on anything that will cause him to be sleepy. Pt denies needing any referral to behavioral health at this time. Prior to Admission medications    Medication Sig Start Date End Date Taking? Authorizing Provider   buPROPion XL (WELLBUTRIN XL) 150 mg tablet Take 1 Tablet by mouth every morning. 7/14/21  Yes Niki Guerra MD   busPIRone (BUSPAR) 7.5 mg tablet Take 1 Tablet by mouth three (3) times daily. 7/14/21  Yes Niki Guerra MD   ezetimibe (ZETIA) 10 mg tablet Take 1 Tab by mouth nightly. 4/5/21  Yes Alicia STOVALL NP   metoprolol tartrate (LOPRESSOR) 25 mg tablet Take 0.5 Tabs by mouth two (2) times a day.  12/31/20  Yes Cari STOVALL NP   Denta 5000 Plus 1.1 % crea USE 3 TIMES A DAY TO BRUSH TEETH 9/10/20  Yes Provider, Historical   amLODIPine (NORVASC) 2.5 mg tablet TAKE 1 TABLET BY MOUTH  DAILY 8/12/20  Yes Tony Mayo NP   atorvastatin (LIPITOR) 80 mg tablet TAKE 1 TABLET BY MOUTH  DAILY 8/12/20  Yes Tony Mayo NP   aspirin delayed-release 81 mg tablet Take 81 mg by mouth daily. Yes Provider, Historical   ascorbic acid, vitamin C, (VITAMIN C) 500 mg tablet Take 500 mg by mouth two (2) times a day. Yes Provider, Historical     Patient Active Problem List    Diagnosis Date Noted    Non-recurrent bilateral inguinal hernia without obstruction or gangrene 10/14/2020    Mixed hyperlipidemia 02/22/2017    High cholesterol 11/22/2016    S/P CABG x 4 09/15/2016    Coronary artery disease involving native coronary artery of native heart without angina pectoris 09/14/2016    Chronic total occlusion of coronary artery 09/14/2016     Current Outpatient Medications   Medication Sig Dispense Refill    buPROPion XL (WELLBUTRIN XL) 150 mg tablet Take 1 Tablet by mouth every morning. 30 Tablet 2    busPIRone (BUSPAR) 7.5 mg tablet Take 1 Tablet by mouth three (3) times daily. 30 Tablet 1    ezetimibe (ZETIA) 10 mg tablet Take 1 Tab by mouth nightly. 90 Tab 3    metoprolol tartrate (LOPRESSOR) 25 mg tablet Take 0.5 Tabs by mouth two (2) times a day. 90 Tab 3    Denta 5000 Plus 1.1 % crea USE 3 TIMES A DAY TO BRUSH TEETH      amLODIPine (NORVASC) 2.5 mg tablet TAKE 1 TABLET BY MOUTH  DAILY 90 Tab 3    atorvastatin (LIPITOR) 80 mg tablet TAKE 1 TABLET BY MOUTH  DAILY 90 Tab 3    aspirin delayed-release 81 mg tablet Take 81 mg by mouth daily.  ascorbic acid, vitamin C, (VITAMIN C) 500 mg tablet Take 500 mg by mouth two (2) times a day.        Allergies   Allergen Reactions    Tramadol      hallucinations and syncope necessitating ER trip    Tramadol Other (comments)     fainted     Past Medical History: Diagnosis Date    Coronary artery disease involving native coronary artery with unstable angina pectoris ([facility] [address] 2016    HLD (hyperlipidemia)     Hx of CABG 2016    LIMA to LAD; seq RA to ramus, OM and RCA    Hypertension      Past Surgical History:   Procedure Laterality Date    HX CORONARY ARTERY BYPASS GRAFT  09/15/2016    HX HERNIA REPAIR  2020    Dr. Shahram Storey Left     left calcaneous surgery      HX TONSILLECTOMY  childhood    HX WISDOM TEETH EXTRACTION       Family History   Problem Relation Age of Onset    Heart Disease Father     Heart Disease Maternal Grandfather     Cancer Mother         Lung cacer - non-smoker    Heart Disease Brother     Other Son         Back issues    Anxiety Daughter      Social History     Tobacco Use    Smoking status: Former Smoker     Years: 20.00     Quit date: 2008     Years since quittin.5    Smokeless tobacco: Never Used   Substance Use Topics    Alcohol use: Yes     Comment: rare, not monthly       Review of Systems   Constitutional: Negative. HENT: Negative. Eyes: Negative. Respiratory: Negative. Cardiovascular: Negative. Gastrointestinal: Negative. Genitourinary: Negative. Musculoskeletal: Negative. Skin: Negative. Neurological: Negative. Endo/Heme/Allergies: Negative. Psychiatric/Behavioral: Positive for depression. The patient is nervous/anxious. Objective:   No flowsheet data found.      [INSTRUCTIONS:  \"[x]\" Indicates a positive item  \"[]\" Indicates a negative item  -- DELETE ALL ITEMS NOT EXAMINED]    Constitutional:  [x] No apparent distress      [] Abnormal -     Mental status: [x] Alert and awake  [x] Oriented to person/place/time [x] Able to follow commands    [] Abnormal -            Psychiatric:       [x] Normal Affect [] Abnormal -        [x] No Hallucinations    Other pertinent observable physical exam findings:-        We discussed the expected course, resolution and complications of the diagnosis(es) in detail. Medication risks, benefits, costs, interactions, and alternatives were discussed as indicated. I advised him to contact the office if his condition worsens, changes or fails to improve as anticipated. He expressed understanding with the diagnosis(es) and plan. Makenna Means, was evaluated through a synchronous (real-time) audio-video encounter. The patient (or guardian if applicable) is aware that this is a billable service. Verbal consent to proceed has been obtained within the past 12 months. The visit was conducted pursuant to the emergency declaration under the [address] [place] [address], [address] [place] [address] authority and the [employer] and [employer] General Act. Patient identification was verified, and a caregiver was present when appropriate. The patient was located in a state where the provider was credentialed to provide care.       Cammie Laguerre, as dictated by Ana M Paredes MD.

## 2021-08-09 DIAGNOSIS — F41.9 ANXIETY AND DEPRESSION: ICD-10-CM

## 2021-08-09 DIAGNOSIS — F32.A ANXIETY AND DEPRESSION: ICD-10-CM

## 2021-08-09 NOTE — TELEPHONE ENCOUNTER
----- Message from Elias Tom sent at 8/9/2021  8:52 AM EDT -----  Regarding: /Refill  Medication Refill    Caller (if not patient):N/A      Relationship of caller (if not patient):N/A      Best contact number(s):[phone]      Name of medication and dosage if known: Buspirone 7.5 mg      Is patient out of this medication (yes/no):Yes      Pharmacy name:[facility]    Pharmacy listed in chart? (yes/no):Yes  Pharmacy phone [id number]:[phone]      Details to clarify the request:Pt has been out of medication the entire weekend       Message from [facility]

## 2021-08-10 DIAGNOSIS — F32.A ANXIETY AND DEPRESSION: ICD-10-CM

## 2021-08-10 DIAGNOSIS — F41.9 ANXIETY AND DEPRESSION: ICD-10-CM

## 2021-08-10 RX ORDER — BUPROPION HYDROCHLORIDE 150 MG/1
150 TABLET ORAL
Qty: 90 TABLET | Refills: 1 | Status: SHIPPED | OUTPATIENT
Start: 2021-08-10 | End: 2021-12-08

## 2021-08-10 RX ORDER — BUSPIRONE HYDROCHLORIDE 7.5 MG/1
7.5 TABLET ORAL 3 TIMES DAILY
Qty: 270 TABLET | Refills: 1 | Status: SHIPPED | OUTPATIENT
Start: 2021-08-10 | End: 2021-12-27 | Stop reason: SDUPTHER

## 2021-08-10 RX ORDER — BUSPIRONE HYDROCHLORIDE 7.5 MG/1
7.5 TABLET ORAL 3 TIMES DAILY
Qty: 30 TABLET | Refills: 1 | Status: SHIPPED | OUTPATIENT
Start: 2021-08-10 | End: 2022-03-30 | Stop reason: SDUPTHER

## 2021-08-10 NOTE — TELEPHONE ENCOUNTER
Future Appointments:  Future Appointments   Date Time Provider Criselda Jordan   11/4/2021  1:00 PM Devante Woods MD [facility] AMB        Last Appointment With Me:  7/14/2021     Requested Prescriptions     Pending Prescriptions Disp Refills    busPIRone (BUSPAR) 7.5 mg tablet 30 Tablet 1     Sig: Take 1 Tablet by mouth three (3) times daily.

## 2021-08-11 RX ORDER — BUSPIRONE HYDROCHLORIDE 7.5 MG/1
TABLET ORAL
Qty: 30 TABLET | Refills: 1 | Status: SHIPPED | OUTPATIENT
Start: 2021-08-11 | End: 2021-12-27 | Stop reason: SDUPTHER

## 2021-09-06 DIAGNOSIS — Z95.1 S/P CABG X 4: ICD-10-CM

## 2021-09-07 RX ORDER — ATORVASTATIN CALCIUM 80 MG/1
TABLET, FILM COATED ORAL
Qty: 90 TABLET | Refills: 3 | Status: SHIPPED | OUTPATIENT
Start: 2021-09-07

## 2021-09-07 RX ORDER — AMLODIPINE BESYLATE 2.5 MG/1
TABLET ORAL
Qty: 90 TABLET | Refills: 3 | Status: SHIPPED | OUTPATIENT
Start: 2021-09-07

## 2021-12-01 RX ORDER — METOPROLOL TARTRATE 25 MG/1
TABLET, FILM COATED ORAL
Qty: 90 TABLET | Refills: 3 | Status: SHIPPED | OUTPATIENT
Start: 2021-12-01

## 2021-12-06 DIAGNOSIS — F41.9 ANXIETY AND DEPRESSION: ICD-10-CM

## 2021-12-06 DIAGNOSIS — F32.A ANXIETY AND DEPRESSION: ICD-10-CM

## 2021-12-08 RX ORDER — BUPROPION HYDROCHLORIDE 150 MG/1
TABLET ORAL
Qty: 90 TABLET | Refills: 3 | Status: SHIPPED | OUTPATIENT
Start: 2021-12-08

## 2021-12-27 ENCOUNTER — OFFICE VISIT (OUTPATIENT)
Dept: CARDIOLOGY CLINIC | Age: 76
End: 2021-12-27
Payer: MEDICARE

## 2021-12-27 VITALS
RESPIRATION RATE: 18 BRPM | DIASTOLIC BLOOD PRESSURE: 80 MMHG | WEIGHT: 151.2 LBS | HEIGHT: 69 IN | BODY MASS INDEX: 22.39 KG/M2 | OXYGEN SATURATION: 98 % | HEART RATE: 76 BPM | SYSTOLIC BLOOD PRESSURE: 112 MMHG

## 2021-12-27 DIAGNOSIS — E78.2 MIXED HYPERLIPIDEMIA: ICD-10-CM

## 2021-12-27 DIAGNOSIS — I25.10 CORONARY ARTERY DISEASE INVOLVING NATIVE CORONARY ARTERY OF NATIVE HEART WITHOUT ANGINA PECTORIS: Primary | ICD-10-CM

## 2021-12-27 DIAGNOSIS — Z95.1 S/P CABG X 4: ICD-10-CM

## 2021-12-27 PROCEDURE — 99214 OFFICE O/P EST MOD 30 MIN: CPT | Performed by: NURSE PRACTITIONER

## 2021-12-27 PROCEDURE — G8427 DOCREV CUR MEDS BY ELIG CLIN: HCPCS | Performed by: NURSE PRACTITIONER

## 2021-12-27 PROCEDURE — G8420 CALC BMI NORM PARAMETERS: HCPCS | Performed by: NURSE PRACTITIONER

## 2021-12-27 PROCEDURE — 1101F PT FALLS ASSESS-DOCD LE1/YR: CPT | Performed by: NURSE PRACTITIONER

## 2021-12-27 PROCEDURE — 3017F COLORECTAL CA SCREEN DOC REV: CPT | Performed by: NURSE PRACTITIONER

## 2021-12-27 PROCEDURE — G8432 DEP SCR NOT DOC, RNG: HCPCS | Performed by: NURSE PRACTITIONER

## 2021-12-27 PROCEDURE — 93000 ELECTROCARDIOGRAM COMPLETE: CPT | Performed by: NURSE PRACTITIONER

## 2021-12-27 PROCEDURE — G8536 NO DOC ELDER MAL SCRN: HCPCS | Performed by: NURSE PRACTITIONER

## 2021-12-27 NOTE — PROGRESS NOTES
Chief Complaint   Patient presents with    Coronary Artery Disease     Annual follow up - denies cardiac sx    1. Have you been to the ER, urgent care clinic since your last visit? Hospitalized since your last visit? No     2. Have you seen or consulted any other health care providers outside of the [address] [place] [address] since your last visit? Include any pap smears or colon screening.   Yes Dr Mane Benz for edinson hernia surgery

## 2021-12-27 NOTE — PROGRESS NOTES
Claire Travis, [facility]    Subjective/HPI:     Tiara Mack is a 78 y.o. male is here for routine f/u. He has a PMHx of CAD s/p CABG x 4 and HLD. Feels well. His wife was diagnosed with inoperable bone cancer and is now on hospice. This has been hard to deal with. They have been  18 years. He has been coping by starting anti-depressants. Otherwise denies complaints of chest pains, dizziness, orthopnea, PND or edema. Denies palpitation symptoms. Current Outpatient Medications on File Prior to Visit   Medication Sig Dispense Refill    buPROPion XL (WELLBUTRIN XL) 150 mg tablet TAKE 1 TABLET BY MOUTH IN  THE MORNING 90 Tablet 3    metoprolol tartrate (LOPRESSOR) 25 mg tablet TAKE ONE-HALF TABLET BY  MOUTH TWICE DAILY 90 Tablet 3    amLODIPine (NORVASC) 2.5 mg tablet TAKE 1 TABLET BY MOUTH  DAILY 90 Tablet 3    atorvastatin (LIPITOR) 80 mg tablet TAKE 1 TABLET BY MOUTH  DAILY 90 Tablet 3    busPIRone (BUSPAR) 7.5 mg tablet Take 1 Tablet by mouth three (3) times daily. 30 Tablet 1    ezetimibe (ZETIA) 10 mg tablet Take 1 Tab by mouth nightly. 90 Tab 3    Denta 5000 Plus 1.1 % crea USE 3 TIMES A DAY TO BRUSH TEETH      aspirin delayed-release 81 mg tablet Take 81 mg by mouth daily.  ascorbic acid, vitamin C, (VITAMIN C) 500 mg tablet Take 500 mg by mouth two (2) times a day.  [DISCONTINUED] busPIRone (BUSPAR) 7.5 mg tablet TAKE 1 TABLET BY MOUTH THREE TIMES DAILY 30 Tablet 1    [DISCONTINUED] busPIRone (BUSPAR) 7.5 mg tablet Take 1 Tablet by mouth three (3) times daily. 270 Tablet 1     No current facility-administered medications on file prior to visit. Review of Symptoms:    Review of Systems   Constitutional: Negative for chills, fever and weight loss. HENT: Negative for nosebleeds. Eyes: Negative for blurred vision and double vision. Respiratory: Negative for cough, shortness of breath and wheezing.     Cardiovascular: Negative for chest pain, palpitations, orthopnea, leg swelling and PND. Skin: Negative for rash. Neurological: Negative for dizziness and loss of consciousness. Physical Exam:      General: Well developed, in no acute distress, cooperative and alert  Heart:  reg rate and rhythm; normal S1/S2; no murmurs, no gallops or rubs. Respiratory: Clear bilaterally x 4, no wheezing or rales  Extremities:  Normal cap refill, no cyanosis, atraumatic. No edema. Vascular: 2+ pulses symmetric in all extremities    Vitals:    12/27/21 1425 12/27/21 1434 12/27/21 1435   BP: 114/82 116/80 112/80   BP 1 Location: Left arm Left arm Left arm   BP Patient Position: Supine Sitting Sitting   BP Cuff Size: Large adult Large adult Large adult   Pulse: (!) 50 65 76   Resp: 18     Height: 5' 9\" (1.753 m)     Weight: 151 lb 3.2 oz (68.6 kg)     SpO2: 98%         ECG done today shows sinus bradycardia. Assessment:       ICD-10-CM ICD-9-CM    1. Coronary artery disease involving native coronary artery of native heart without angina pectoris  I25.10 414.01 AMB POC EKG ROUTINE W/ 12 LEADS, INTER & REP   2. Mixed hyperlipidemia  E78.2 272.2 LIPID PANEL      METABOLIC PANEL, COMPREHENSIVE   3. S/P CABG x 4  Z95.1 V45.81         Plan:     1. Coronary artery disease involving native coronary artery of native heart without angina pectoris  S/p CABG x 4 in 9/2016  Echo done 11/2019 with preserved LVEF 50-55% with mild MR with mild MV prolapse  Without anginal or anginal equivalent symptoms  Continue statin, Zetia, BB and ASA     2. Mixed hyperlipidemia  LDL 46 in 9/2020  Continue statin & Zetia therapy and low fat, low cholesterol diet  Check lipids this year     3.  S/P CABG x 4  Hx of CABG x 4 with LIMA to LAD, seq RA to ramus to OM and RCA in 9/2016    F/u with Dr. Loli Wyatt in 1 year    Mercy Garcia NP

## 2022-01-17 ENCOUNTER — TELEPHONE (OUTPATIENT)
Dept: CARDIOLOGY CLINIC | Age: 77
End: 2022-01-17

## 2022-01-17 NOTE — TELEPHONE ENCOUNTER
Spoke with patient. Verified patient with two patient identifiers. Advised pt he was overdue for labs. Pt states he will get done when he gets it done. Strongly advised he get lab work done. Patient verbalized understanding.

## 2022-01-21 ENCOUNTER — TELEPHONE (OUTPATIENT)
Dept: CARDIOLOGY CLINIC | Age: 77
End: 2022-01-21

## 2022-01-21 LAB
ALBUMIN SERPL-MCNC: 4.1 G/DL (ref 3.7–4.7)
ALBUMIN/GLOB SERPL: 1.6 {RATIO} (ref 1.2–2.2)
ALP SERPL-CCNC: 83 IU/L (ref 44–121)
ALT SERPL-CCNC: 31 IU/L (ref 0–44)
AST SERPL-CCNC: 33 IU/L (ref 0–40)
BILIRUB SERPL-MCNC: 0.7 MG/DL (ref 0–1.2)
BUN SERPL-MCNC: 19 MG/DL (ref 8–27)
BUN/CREAT SERPL: 16 (ref 10–24)
CALCIUM SERPL-MCNC: 8.9 MG/DL (ref 8.6–10.2)
CHLORIDE SERPL-SCNC: 105 MMOL/L (ref 96–106)
CHOLEST SERPL-MCNC: 121 MG/DL (ref 100–199)
CO2 SERPL-SCNC: 26 MMOL/L (ref 20–29)
CREAT SERPL-MCNC: 1.22 MG/DL (ref 0.76–1.27)
GLOBULIN SER CALC-MCNC: 2.6 G/DL (ref 1.5–4.5)
GLUCOSE SERPL-MCNC: 95 MG/DL (ref 65–99)
HDLC SERPL-MCNC: 50 MG/DL
IMP & REVIEW OF LAB RESULTS: NORMAL
INTERPRETATION: NORMAL
LDLC SERPL CALC-MCNC: 58 MG/DL (ref 0–99)
POTASSIUM SERPL-SCNC: 4.5 MMOL/L (ref 3.5–5.2)
PROT SERPL-MCNC: 6.7 G/DL (ref 6–8.5)
SODIUM SERPL-SCNC: 141 MMOL/L (ref 134–144)
TRIGL SERPL-MCNC: 58 MG/DL (ref 0–149)
VLDLC SERPL CALC-MCNC: 13 MG/DL (ref 5–40)

## 2022-01-21 NOTE — TELEPHONE ENCOUNTER
Called and spoke to patient. Verified with two identifiers. Informed of the following message:  ----- Message from Elizabeth Andrea NP sent at 1/21/2022  2:28 PM EST -----  Please call the patient and inform that lipids remain at goal.  Continue all meds, no changes. Patient verbalized understanding.

## 2022-01-21 NOTE — PROGRESS NOTES
Please call the patient and inform that lipids remain at goal.  Continue all meds, no changes.     Thanks,  Tushar Garcia

## 2022-02-22 RX ORDER — EZETIMIBE 10 MG/1
TABLET ORAL
Qty: 90 TABLET | Refills: 3 | Status: SHIPPED | OUTPATIENT
Start: 2022-02-22

## 2022-03-18 PROBLEM — E78.2 MIXED HYPERLIPIDEMIA: Status: ACTIVE | Noted: 2017-02-22

## 2022-03-19 PROBLEM — K40.20 NON-RECURRENT BILATERAL INGUINAL HERNIA WITHOUT OBSTRUCTION OR GANGRENE: Status: ACTIVE | Noted: 2020-10-14

## 2022-03-30 DIAGNOSIS — F32.A ANXIETY AND DEPRESSION: ICD-10-CM

## 2022-03-30 DIAGNOSIS — F41.9 ANXIETY AND DEPRESSION: ICD-10-CM

## 2022-03-30 RX ORDER — BUSPIRONE HYDROCHLORIDE 7.5 MG/1
7.5 TABLET ORAL 3 TIMES DAILY
Qty: 360 TABLET | Refills: 1 | Status: SHIPPED | OUTPATIENT
Start: 2022-03-30 | End: 2022-09-28 | Stop reason: SDUPTHER

## 2022-03-30 RX ORDER — BUSPIRONE HYDROCHLORIDE 7.5 MG/1
7.5 TABLET ORAL 3 TIMES DAILY
Qty: 30 TABLET | Refills: 1 | Status: SHIPPED | OUTPATIENT
Start: 2022-03-30

## 2022-03-30 NOTE — TELEPHONE ENCOUNTER
Pt states his wife passed on 3-24-22. Pt states he is out of this medication for tomorrow afternoon and didn't realize it with everything going on. Pt needs a ten day supply to go local [facility] and then another refill request is being sent for mail order      Pt would like you to most defiantly let Dr. Juan Carlos Duron know of his wife, Bev Grfifin passing.

## 2022-06-30 ENCOUNTER — APPOINTMENT (OUTPATIENT)
Dept: CT IMAGING | Age: 77
End: 2022-06-30
Attending: EMERGENCY MEDICINE
Payer: MEDICARE

## 2022-06-30 ENCOUNTER — HOSPITAL ENCOUNTER (EMERGENCY)
Age: 77
Discharge: HOME | End: 2022-06-30
Attending: EMERGENCY MEDICINE
Payer: MEDICARE

## 2022-06-30 ENCOUNTER — VIRTUAL VISIT (OUTPATIENT)
Dept: INTERNAL MEDICINE CLINIC | Age: 77
End: 2022-06-30
Payer: MEDICARE

## 2022-06-30 VITALS
OXYGEN SATURATION: 96 % | SYSTOLIC BLOOD PRESSURE: 109 MMHG | RESPIRATION RATE: 18 BRPM | HEART RATE: 61 BPM | DIASTOLIC BLOOD PRESSURE: 71 MMHG | TEMPERATURE: 97.5 F

## 2022-06-30 DIAGNOSIS — F43.21 GRIEF REACTION: ICD-10-CM

## 2022-06-30 DIAGNOSIS — F32.A ANXIETY AND DEPRESSION: Primary | ICD-10-CM

## 2022-06-30 DIAGNOSIS — Z00.8 EVALUATION BY PSYCHIATRIC SERVICE REQUIRED: ICD-10-CM

## 2022-06-30 DIAGNOSIS — F41.9 ANXIETY AND DEPRESSION: Primary | ICD-10-CM

## 2022-06-30 DIAGNOSIS — F43.21 GRIEF REACTION: Primary | ICD-10-CM

## 2022-06-30 DIAGNOSIS — F43.0 ACUTE STRESS DISORDER: ICD-10-CM

## 2022-06-30 PROCEDURE — 90791 PSYCH DIAGNOSTIC EVALUATION: CPT

## 2022-06-30 PROCEDURE — 94762 N-INVAS EAR/PLS OXIMTRY CONT: CPT

## 2022-06-30 PROCEDURE — 99441 PR PHYS/QHP TELEPHONE EVALUATION 5-10 MIN: CPT | Performed by: FAMILY MEDICINE

## 2022-06-30 PROCEDURE — 99285 EMERGENCY DEPT VISIT HI MDM: CPT

## 2022-06-30 PROCEDURE — 99281 EMR DPT VST MAYX REQ PHY/QHP: CPT

## 2022-06-30 NOTE — PROGRESS NOTES
Rm    Chief Complaint   Patient presents with    Depression    Anxiety            1. Have you been to the ER, urgent care clinic since your last visit? Hospitalized since your last visit? \[facility]\"" ED 6/30/22    2. Have you seen or consulted any other health care providers outside of the [address] [place] [address] since your last visit? Include any pap smears or colon screening. No    Health Maintenance Due   Topic Date Due    Hepatitis C Screening  Never done    COVID-19 Vaccine (1) Never done    DTaP/Tdap/Td series (1 - Tdap) Never done    Colorectal Cancer Screening Combo  Never done    Shingrix Vaccine Age 50> (1 of 2) Never done    Pneumococcal 65+ years (1 - PCV) Never done    Medicare Yearly Exam  06/22/2018        3 most recent PHQ Screens 6/30/2022   Little interest or pleasure in doing things Several days   Feeling down, depressed, irritable, or hopeless Several days   Total Score PHQ 2 2        Fall Risk Assessment, last 12 mths 6/30/2022   Able to walk?  No   Fall in past 12 months? -   Do you feel unsteady? -   Are you worried about falling -       Learning Assessment 6/21/2017   PRIMARY LEARNER Patient   HIGHEST LEVEL OF EDUCATION - PRIMARY LEARNER  -   BARRIERS PRIMARY LEARNER -   CO-LEARNER CAREGIVER -   PRIMARY LANGUAGE ENGLISH   LEARNER PREFERENCE PRIMARY READING     LISTENING   ANSWERED BY patient   RELATIONSHIP SELF

## 2022-06-30 NOTE — PROGRESS NOTES
Landen Church is a 78 y.o. male who was seen by synchronous (real-time) audio technology on 6/30/2022 for Depression and Anxiety        Assessment & Plan:   Diagnoses and all orders for this visit:    1. Anxiety and depression    2. Grief reaction    Anxiety and Depression: He was encouraged to establish with a counselor. I suggested increasing his Wellbutrin, but he wants to remain on his current dose. He was advised to contact the office if he has any increased grief and depression. I spent at least 6 minutes on this visit with this established patient. Subjective:   He presents today for a cc of depression. He is doing well today. To relieve stress about his grief, he talks to the hospice people. He was sitting in his wife's car, and he became overwhelmed emotionally. He presented to the ER today, due to concerns with mental health. He was expressing mental distress over the loss of his wife. Prior to Admission medications    Medication Sig Start Date End Date Taking? Authorizing Provider   busPIRone (BUSPAR) 7.5 mg tablet Take 1 Tablet by mouth three (3) times daily. 3/30/22  Yes Katharine Gibson MD   busPIRone (BUSPAR) 7.5 mg tablet Take 1 Tablet by mouth three (3) times daily.  3/30/22  Yes Katharine Gibson MD   ezetimibe (ZETIA) 10 mg tablet TAKE 1 TABLET BY MOUTH AT  NIGHT 2/22/22  Yes Julian Woods MD   buPROPion XL (WELLBUTRIN XL) 150 mg tablet TAKE 1 TABLET BY MOUTH IN  THE MORNING 12/8/21  Yes Katharine Gibson MD   metoprolol tartrate (LOPRESSOR) 25 mg tablet TAKE ONE-HALF TABLET BY  MOUTH TWICE DAILY 12/1/21  Yes Sierra Garrison D, EFRAIN   amLODIPine (NORVASC) 2.5 mg tablet TAKE 1 TABLET BY MOUTH  DAILY 9/7/21  Yes Iris Mayo NP   atorvastatin (LIPITOR) 80 mg tablet TAKE 1 TABLET BY MOUTH  DAILY 9/7/21  Yes Iris Mayo NP   Denta 5000 Plus 1.1 % crea USE 3 TIMES A DAY TO BRUSH TEETH 9/10/20  Yes Provider, Historical   aspirin delayed-release 81 mg tablet Take 81 mg by mouth daily. Yes Provider, Historical   ascorbic acid, vitamin C, (VITAMIN C) 500 mg tablet Take 500 mg by mouth two (2) times a day. Yes Provider, Historical     Patient Active Problem List    Diagnosis Date Noted    Non-recurrent bilateral inguinal hernia without obstruction or gangrene 10/14/2020    Mixed hyperlipidemia 02/22/2017    S/P CABG x 4 09/15/2016    Coronary artery disease involving native coronary artery of native heart without angina pectoris 09/14/2016    Chronic total occlusion of coronary artery 09/14/2016     Current Outpatient Medications   Medication Sig Dispense Refill    busPIRone (BUSPAR) 7.5 mg tablet Take 1 Tablet by mouth three (3) times daily. 30 Tablet 1    busPIRone (BUSPAR) 7.5 mg tablet Take 1 Tablet by mouth three (3) times daily. 360 Tablet 1    ezetimibe (ZETIA) 10 mg tablet TAKE 1 TABLET BY MOUTH AT  NIGHT 90 Tablet 3    buPROPion XL (WELLBUTRIN XL) 150 mg tablet TAKE 1 TABLET BY MOUTH IN  THE MORNING 90 Tablet 3    metoprolol tartrate (LOPRESSOR) 25 mg tablet TAKE ONE-HALF TABLET BY  MOUTH TWICE DAILY 90 Tablet 3    amLODIPine (NORVASC) 2.5 mg tablet TAKE 1 TABLET BY MOUTH  DAILY 90 Tablet 3    atorvastatin (LIPITOR) 80 mg tablet TAKE 1 TABLET BY MOUTH  DAILY 90 Tablet 3    Denta 5000 Plus 1.1 % crea USE 3 TIMES A DAY TO BRUSH TEETH      aspirin delayed-release 81 mg tablet Take 81 mg by mouth daily.  ascorbic acid, vitamin C, (VITAMIN C) 500 mg tablet Take 500 mg by mouth two (2) times a day.        Allergies   Allergen Reactions    Tramadol      hallucinations and syncope necessitating ER trip    Tramadol Other (comments)     fainted     Past Medical History:   Diagnosis Date    Coronary artery disease involving native coronary artery with unstable angina pectoris ([facility] [address] 9/14/2016    HLD (hyperlipidemia)     Hx of CABG 09/2016    LIMA to LAD; seq RA to ramus, OM and RCA    Hypertension     Syncope      Past Surgical History:   Procedure Laterality Date    HX CORONARY ARTERY BYPASS GRAFT  09/15/2016    HX HERNIA REPAIR  2020    Dr. Marty Rich Bilateral 2021    HX ORTHOPAEDIC Left     left calcaneous surgery      HX TONSILLECTOMY  childhood    HX WISDOM TEETH EXTRACTION       Family History   Problem Relation Age of Onset    Heart Disease Father     Heart Disease Maternal Grandfather     Cancer Mother         Lung cacer - non-smoker    Heart Disease Brother     Other Son         Back issues    Anxiety Daughter      Social History     Tobacco Use    Smoking status: Former Smoker     Years: 20.00     Quit date: 2008     Years since quittin.5    Smokeless tobacco: Never Used   Substance Use Topics    Alcohol use: Yes     Comment: rare, not monthly       Review of Systems   Constitutional: Negative. HENT: Negative. Eyes: Negative. Respiratory: Negative. Cardiovascular: Negative. Gastrointestinal: Negative. Genitourinary: Negative. Musculoskeletal: Negative. Skin: Negative. Neurological: Negative. Endo/Heme/Allergies: Negative. Psychiatric/Behavioral: Positive for depression. Objective:   No flowsheet data found. [INSTRUCTIONS:  \"[x]\" Indicates a positive item  \"[]\" Indicates a negative item  -- DELETE ALL ITEMS NOT EXAMINED]    Constitutional: [x] Appears well-developed and well-nourished [x] No apparent distress      [] Abnormal -     Mental status: [x] Alert and awake  [x] Oriented to person/place/time [x] Able to follow commands    [] Abnormal -              Psychiatric:       [x] Normal Affect [] Abnormal -        [x] No Hallucinations    Other pertinent observable physical exam findings:-        We discussed the expected course, resolution and complications of the diagnosis(es) in detail. Medication risks, benefits, costs, interactions, and alternatives were discussed as indicated.   I advised him to contact the office if his condition worsens, changes or fails to improve as anticipated. He expressed understanding with the diagnosis(es) and plan. Per Spring, was evaluated through a synchronous (real-time) audio encounter. The patient (or guardian if applicable) is aware that this is a billable service, which includes applicable co-pays. This Virtual Visit was conducted with patient's (and/or legal guardian's) consent. The visit was conducted pursuant to the emergency declaration under the [address] [place] [address] authority and the [employer] and [employer] General Act. Patient identification was verified, and a caregiver was present when appropriate. The patient was located at: Home: [address] [place] [id number]  The provider was located at:  Facility (Appt Department): [address] [place] [address],[address]    Written by Sergo Coelho, as dictated by Stan Ortega MD.      Luis Miguel Lopez

## 2022-06-30 NOTE — ED NOTES
Pt ambulatory and agreeable to d/c instructions. Son called and also informed of d/c instructions. Son to take pt home and stay with him.

## 2022-06-30 NOTE — BSMART NOTE
Comprehensive Assessment Form Part 1      Section I - Disposition    DX: Acute Stress Disorder          Grief and Loss      The Medical Doctor to Psychiatrist conference was not completed. The Medical Doctor is in agreement with Psychiatrist disposition because of (reason) patient is not seeking an admission and does not require a TDO. The plan is to discharge patient in care of his son. Patient given bereavement resources and he stated he will resource he has from hospice    The on-call Psychiatrist consulted was Dr. Gracie Freed. The admitting Psychiatrist will be Dr. Gracie Freed. The admitting Diagnosis is none. The Payor source is [facility] MEDICARE COMPLETE/[facility] MEDICARE COMPLETE. The name of the representative was . This was . This writer reviewed the [address] in nursing flowsheet and the risk level assigned is no risk. Based on this assessment, the risk of suicide is no risk and the plan is discharge with resources for grief and loss. Section II - Integrated Summary  Summary:  Per Triage: Found in middle of road in car with doors locked and vehicle off. Pt was not answering any questions or acknowledging anyone on officer arrival. W/ EMS, pt A/Ox1. On arrival, pt A/Ox4. Pt wife  3/24 and pt is only speaking about her. Pt tearful and repetitive. States \"you don't know hell\" and \"I just want to be where [facility] is\". Pt denies SI. States \"I didn't do anything wrong. I was just in my driveway\". At bedside, patient reported tonight he had issues in regards to his wife's death as reported 3/24/2022. Patient discussed his wife in detail and the work she did within [facility]. As reported she  from spinal cancer. Patient stated his wife made him promise that he would not let her health and death worry him. Patient discussed that they knew how serious it was because the providers told them there was nothing they could do before she passed.  Patient stated all I can do is try and that is what I am doing but it is hard. Patient denied suicidal, homicidal thoughts and hallucinations. Patient does not have any current mental health providers but he reported his PCP giving him medication to help sleep as it has been difficult. Patient was future oriented as he talked about his family and grandchildren as reasons he has to live and would not hurt himself. Patient reported he has talked with a Hospice spiritual relief person a couple times after his wife death but he acknowledged the program has reached out to him about offering support and he hasnot called back yet but he thinks now would be a good idea to get some support. Patient also reported having support from his family. Patient lives alone at this time. Patient denied any changes to his living routine and stated he continues to be able to follow through with daily living skills. Patient is not seeking an admission and there is not criteria for admission at this time. Patient was calm, cooperative and appreciative of this writer to listen to him and talk. This writer spoke with patient's son Mira Laguerre) via telephone who stated him and his mother give support to the client since his wife passed. As reported the patient has had another wife in the past to die by car accident so he has had some \"trauma\". Ghislaine Argueta stated he does not feel his father is risk to himself or others. He reported noticing his father's emotions initially have been up and down but which in conjunction with possible drinking and taking his medication cause poor judgment. As reported his father can come stay with them or someone will stay at the home with him. The patienthas demonstrated mental capacity to provide informed consent. The information is given by the patient and relative(s). The Chief Complaint is sadness. The Precipitant Factors are death of wife.   Previous Hospitalizations: possible , unknown  The patient has not previously been in restraints. Current Psychiatrist and/or  is none. Lethality Assessment:    The potential for suicide noted by the following: not noted . The potential for homicide is not noted. The patient has not been a perpetrator of sexual or physical abuse. There are not pending charges. The patient is not felt to be at risk for self harm or harm to others. The attending nurse was advised not noted. Section III - Psychosocial  The patient's overall mood and attitude is sad , calm and cooperative, good mood/ open communication. Feelings of helplessness and hopelessness are not observed. Generalized anxiety is not observed. Panic is not observed. Phobias are not observed. Obsessive compulsive tendencies are not observed. Section IV - Mental Status Exam  The patient's appearance shows no evidence of impairment. The patient's behavior shows no evidence of impairment. The patient is oriented to time, place, person and situation. The patient's speech shows no evidence of impairment. The patient's mood is euthymic and is sad. The range of affect shows no evidence of impairment. The patient's thought content demonstrates no evidence of impairment. The thought process shows no evidence of impairment. The patient's perception shows no evidence of impairment. The patient's memory shows no evidence of impairment. The patient's appetite shows no evidence of impairment. The patient's sleep shows no evidence of impairment. The patient's insight shows no evidence of impairment. The patient's judgement shows no evidence of impairment. Section V - Substance Abuse  The patient is using substances. 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 The patient has experienced the following withdrawal symptoms: N/A. Section VI - Living Arrangements  The patient is . The patient lives alone. The patient has children.   The patient does plan to return home upon discharge. The patient does not have legal issues pending. The patient's source of income comes from disability and social security. Amish and cultural practices have not been voiced at this time. The patient's greatest support comes from son and ex-wife and this person will be involved with the treatment. The patient has been in an event described as horrible or outside the realm of ordinary life experience either currently or in the past.  The patient has not been a victim of sexual/physical abuse. Section VII - Other Areas of Clinical Concern  The highest grade achieved is not assessed with the overall quality of school experience being described as not assessed. The patient is currently unemployed and speaks [place] as a primary language. The patient has no communication impairments affecting communication. The patient's preference for learning can be described as: can read and write adequately. The patient's hearing is normal.  The patient's vision is impaired and  wears glasses or contacts.       Eli Campos MA

## 2022-06-30 NOTE — ED NOTES
5: MD informed of pt and chief complaint. Multiple orders placed before MD able to assess pt. Pt argumentative. States \"I didn't do anything wrong. I don't know why I'm here\". Pt a/ox4 and does not want anything done. This RN asked MD to assess pt before orders completed as pt refusing. MD states to [facility]" pt until he is able to personally speak to pt.      80: MD reminded of pt and requested he speak to pt.     0300: Per MD, cancel all lab orders and consult BSMART

## 2022-06-30 NOTE — DISCHARGE INSTRUCTIONS
[address] [facility]  [address] Providers    Accepts Insured Patients Only:  [facility]  [address]       [phone]   Near the corner of [facility] and [address] in the near [facility]. Accepts most insurance including Medicaid/Medicare. No psychiatry. On the [facility] bus line. [phone]  [address] [facility] ([address]  [address] [place]. [address] [facility], [address] [place])     [phone]   Accepts most major insurances. Psychiatry available. Some DBT groups. [facility])    [phone]   Mixture of psychologists and psychiatrists. They do not accept Medicaid or Medicare. [facility]  [address]       [phone]   Mixture of clinical social workers and psychologists. Sliding Scale/Financial Aid/Differing Payment Options  [place] [phone] [email or web address] [facility]) [phone]   Our own Romana Garcia and Zee Maldonado. Variety of treatment options, including DBT. [address]       [phone]   Provides a variety of group and individual counseling options. Insurance, Medicaid, Medicare and sliding scale      Medicaid/Medicare providers in the [address] area  [address] [place] [address]. [address]       [phone]    [facility]         [address]       [phone]    [place]  [address], [facility]    [phone] ex.  [address]     [phone]    [facility] [address]    [facility]      [phone]      Services for patients without Medicaid, [employer] or Insurance  The [address]       [phone]   See handout in separate folder    An [address] 54

## 2022-06-30 NOTE — ED PROVIDER NOTES
EMERGENCY DEPARTMENT HISTORY AND PHYSICAL EXAM      Please note that this dictation was completed with the assistance of \"Dragon\", the computer voice recognition software. Quite often unanticipated grammatical, syntax, homophones, and other interpretive errors are inadvertently transcribed by the computer software. Please disregard these errors and any errors that have escaped final proofreading. Thank you. Date: 22  Patient: Kiana Nieto  Patient Age and Sex: 78 y.o. male   MRN: [id number]  CSN: [id number]    History of Presenting Illness     Chief Complaint   Patient presents with    Altered mental status     Found in middle of road in car with doors locked and vehicle off. Pt was not answering any questions or acknowledging anyone on officer arrival. W/ EMS, pt A/Ox1. On arrival, pt A/Ox4. Pt wife  3/24 and pt is only speaking about her. Pt tearful and repetitive. States \"you don't know hell\" and \"I just want to be where [facility] is\". Pt denies SI. States \"I didn't do anything wrong. I was just in my driveway\".  Mental Health Problem     History Provided By: Patient/family/EMS (if applicable)    HPI: Kiana Nieto, 78 y.o. male with past medical history as documented below presents to the ED with c/o of acute stress and mood disorder. Per EMS, pt was found in the middle of the road with doors locked and vehicle turned off. Pt was not answering questions when bystanders found him. Per EMS, pt initially oriented x 1 only then pt was more alert. No trauma. Pt states wife passed away in March and has been grieving her loss. Denies current SI or HI. No hallucinations. Pt denies any other exacerbating or ameliorating factors.  Additionally, pt specifically denies any recent fever, chills, headache, nausea, vomiting, abdominal pain, CP, SOB, lightheadedness, dizziness, numbness, weakness, lower extremity swelling, heart palpitations, urinary sxs, diarrhea, constipation, melena, hematochezia, cough, or congestion. There are no other complaints, changes or physical findings pertinent to the HPI at this time. PCP: Bo Saunders MD  Past History   Past Medical History:  Past Medical History:   Diagnosis Date    Coronary artery disease involving native coronary artery with unstable angina pectoris ([facility] [address] 2016    HLD (hyperlipidemia)     Hx of CABG 2016    LIMA to LAD; seq RA to ramus, OM and RCA    Hypertension     Syncope        Past Surgical History:  Past Surgical History:   Procedure Laterality Date    HX CORONARY ARTERY BYPASS GRAFT  09/15/2016    HX HERNIA REPAIR  2020    Dr. Marisa Shah Bilateral 2021    HX ORTHOPAEDIC Left     left calcaneous surgery      HX TONSILLECTOMY  childhood    HX WISDOM TEETH EXTRACTION         Family History:   Family history reviewed and was non-contributory, unless specified below:  Family History   Problem Relation Age of Onset    Heart Disease Father     Heart Disease Maternal Grandfather     Cancer Mother         Lung cacer - non-smoker    Heart Disease Brother     Other Son         Back issues    Anxiety Daughter        Social History:  Social History     Tobacco Use    Smoking status: Former Smoker     Years: 20.00     Quit date: 2008     Years since quittin.5    Smokeless tobacco: Never Used   Substance Use Topics    Alcohol use: Yes     Comment: rare, not monthly    Drug use: No       Allergies: Allergies   Allergen Reactions    Tramadol      hallucinations and syncope necessitating ER trip    Tramadol Other (comments)     fainted       Current Medications:  No current facility-administered medications on file prior to encounter. Current Outpatient Medications on File Prior to Encounter   Medication Sig Dispense Refill    busPIRone (BUSPAR) 7.5 mg tablet Take 1 Tablet by mouth three (3) times daily.  30 Tablet 1    busPIRone (BUSPAR) 7.5 mg tablet Take 1 Tablet by mouth three (3) times daily. 360 Tablet 1    ezetimibe (ZETIA) 10 mg tablet TAKE 1 TABLET BY MOUTH AT  NIGHT 90 Tablet 3    buPROPion XL (WELLBUTRIN XL) 150 mg tablet TAKE 1 TABLET BY MOUTH IN  THE MORNING 90 Tablet 3    metoprolol tartrate (LOPRESSOR) 25 mg tablet TAKE ONE-HALF TABLET BY  MOUTH TWICE DAILY 90 Tablet 3    amLODIPine (NORVASC) 2.5 mg tablet TAKE 1 TABLET BY MOUTH  DAILY 90 Tablet 3    atorvastatin (LIPITOR) 80 mg tablet TAKE 1 TABLET BY MOUTH  DAILY 90 Tablet 3    Denta 5000 Plus 1.1 % crea USE 3 TIMES A DAY TO BRUSH TEETH      aspirin delayed-release 81 mg tablet Take 81 mg by mouth daily.  ascorbic acid, vitamin C, (VITAMIN C) 500 mg tablet Take 500 mg by mouth two (2) times a day. Review of Systems   A complete ROS was reviewed by me today and all other systems negative, unless otherwise specified below:  Review of Systems   Constitutional: Negative. Negative for chills and fever. HENT: Negative. Negative for congestion and sore throat. Eyes: Negative. Respiratory: Negative. Negative for cough, chest tightness, shortness of breath and wheezing. Cardiovascular: Negative. Negative for chest pain, palpitations and leg swelling. Gastrointestinal: Negative. Negative for abdominal distention, abdominal pain, blood in stool, constipation, diarrhea, nausea and vomiting. Endocrine: Negative. Genitourinary: Negative. Negative for dysuria, flank pain, frequency, hematuria and urgency. Musculoskeletal: Negative. Negative for arthralgias, back pain and myalgias. Skin: Negative. Negative for color change and rash. Neurological: Negative. Negative for dizziness, syncope, speech difficulty, weakness, light-headedness, numbness and headaches. Hematological: Negative. Psychiatric/Behavioral: Positive for behavioral problems, dysphoric mood and sleep disturbance. Negative for self-injury and suicidal ideas. The patient is not nervous/anxious.     All other systems reviewed and are negative. Physical Exam   Physical Exam  Vitals and nursing note reviewed. Constitutional:       Appearance: He is well-developed. He is not toxic-appearing. HENT:      Head: Normocephalic and atraumatic. Mouth/Throat:      Pharynx: No posterior oropharyngeal erythema. Eyes:      Conjunctiva/sclera: Conjunctivae normal.   Cardiovascular:      Rate and Rhythm: Normal rate and regular rhythm. Heart sounds: Normal heart sounds. No murmur heard. No friction rub. No gallop. Pulmonary:      Effort: Pulmonary effort is normal. No respiratory distress. Breath sounds: Normal breath sounds. No wheezing or rales. Chest:      Chest wall: No tenderness. Abdominal:      General: Bowel sounds are normal. There is no distension. Palpations: Abdomen is soft. There is no mass. Tenderness: There is no abdominal tenderness. There is no guarding or rebound. Musculoskeletal:         General: Normal range of motion. Cervical back: Normal range of motion. Skin:     General: Skin is warm. Neurological:      General: No focal deficit present. Mental Status: He is alert and oriented to person, place, and time. Motor: No abnormal muscle tone. Psychiatric:         Attention and Perception: Attention and perception normal.         Mood and Affect: Affect is tearful. Behavior: Behavior is cooperative. Thought Content: Thought content normal. Thought content is not paranoid or delusional. Thought content does not include homicidal or suicidal ideation. Thought content does not include homicidal or suicidal plan. Cognition and Memory: Cognition normal.         Judgment: Judgment normal.       Diagnostic Study Results     Laboratory Data  I have personally reviewed and interpreted all available laboratory results. No results found for this or any previous visit (from the past 24 hour(s)).     Radiologic Studies   I have personally reviewed and interpreted all available imaging studies and agree with radiology interpretation. No orders to display     CT Results  (Last 48 hours)    None        CXR Results  (Last 48 hours)    None        Medical Decision Making   I am the first and primary ED physician for this patient's ED visit today. I reviewed our electronic medical record system for any past medical records that may contribute to the patient's current condition, including their past medical history, surgical history, social and family history. This also includes their most recent ED visits, previous hospitalizations and prior diagnostic data. I have reviewed and summarized the most pertinent findings in my HPI and MDM. Vital Signs Reviewed:  Patient Vitals for the past 24 hrs:   Temp Pulse Resp BP SpO2   06/30/22 0145 97.5 °F (36.4 °C) -- -- 109/71 96 %   06/30/22 0121 -- 61 18 (!) 133/90 98 %     Pulse Oximetry Analysis: 98% on RA    Cardiac Monitor:   Rate: 60 bpm  The cardiac monitor revealed the following rhythm as interpreted by me: Normal Sinus Rhythm  Cardiac monitoring was ordered to monitor patient for signs of cardiac dysrhythmia, which they are at risk for based on their history and/or risk for cardiovascular disease and/or metabolic abnormalities. Records Reviewed: Nursing Notes, Old Medical Records, Previous electrocardiograms, Previous Radiology Studies and Previous Laboratory Studies, EMS reports    Provider Notes (Medical Decision Making):   Patient presents with acute mood disorder. DDx:  2/2 MDD, schizoaffective d/o, bipolar, drug induced, organic cause such as electrolyte anomoly or infection. Stable vitals and benign exam. No obvious organic causes to explain behavior but will obtain psych labs, UA, UDS and speak with mental health professional about possible admission. Pt is currently voluntary. Sitter at bedside. Will continue to monitor while in ED. ED Course:   Initial assessment performed.  I discussed presenting problems and concerns, and my formulated plan for today's visit with the patient and any available family members. I have encouraged them to ask questions as they arise throughout the visit. Social History     Tobacco Use    Smoking status: Former Smoker     Years: 20.00     Quit date: 2008     Years since quittin.5    Smokeless tobacco: Never Used   Substance Use Topics    Alcohol use: Yes     Comment: rare, not monthly    Drug use: No       ED Orders Placed:  No orders of the defined types were placed in this encounter. ED Medications Administered During ED Course:  Medications - No data to display     Progress Note:  I have just re-evaluated the patient. Patient reports improvement of sx's. I have reviewed His vital signs and determined there is currently no worsening in their condition or physical exam. Results have been reviewed with them and their questions have been answered. We will continue to review further results as they come available. Consult Note:  Claudia Tripp MD spoke with Dayanna Alvarado  Specialty: [facility]  Discussed pt's hx, disposition, and available diagnostic and imaging results. Reviewed care plans. Agree with management and plan thus far. Consultant will evaluate pt. Progress Note:  Pt cleared for discharge by [facility], will provide outpatient behavioral health resources. Progress Note:  Pt reassessed and symptoms noted to have improved significantly after ED treatment. Pt is clinically stable for discharge. Bina Benavides's labs and imaging have been reviewed with him and available family. He verbally conveys understanding and agreement of the signs, symptoms, diagnosis, treatment and prognosis and additionally agrees to follow up as recommended with Dr. Leanne Mcdonnell MD and/or specialist as instructed. He agrees with the care plan we have created and conveys that all of his questions have been answered.  Additionally, I have put together a packet of discharge instructions for him that include: 1) educational information regarding their diagnosis, 2) how to care for their diagnosis at home, as well a 3) list of reasons why they would want to return to the ED prior to their follow-up appointment should the patient's condition change or symptoms worsen. I have answered all questions to the patient's satisfaction. Strict return precautions given. He conveyed understanding and agreement with care plan. Vital signs stable for discharge. Disposition:  DISCHARGE  The pt is ready for discharge. The pt's signs, symptoms, diagnosis, and discharge instructions have been discussed and pt has conveyed their understanding. The pt is to follow up as recommended or return to ER should their symptoms worsen. Plan has been discussed and pt is in agreement. Plan:  1. Return precautions as discussed with patient and available family/caregiver. 2.   Discharge Medication List as of 6/30/2022  4:25 AM        3. Follow-up Information     Follow up With Specialties Details Why Contact Info    [facility] EMERGENCY DEPT Emergency Medicine  As needed, If symptoms worsen [address] [place] [address]  [phone]        Instructed to return to ED if worse  Diagnosis   Clinical Impression:  1. Grief reaction    2. Acute stress disorder    3. Evaluation by psychiatric service required      Attestation:  Essence Andrews MD, am the attending of record for this patient. I personally performed the services described in this documentation on this date, 6/30/2022 for patient, Tiara Mack. I have reviewed the chart and verified that the record is accurate and complete.

## 2022-09-28 DIAGNOSIS — F32.A ANXIETY AND DEPRESSION: ICD-10-CM

## 2022-09-28 DIAGNOSIS — F41.9 ANXIETY AND DEPRESSION: ICD-10-CM

## 2022-09-28 NOTE — TELEPHONE ENCOUNTER
PCP: Mary Ann Porter MD    Last appt: 6/30/2022  No future appointments. Requested Prescriptions     Pending Prescriptions Disp Refills    busPIRone (BUSPAR) 7.5 mg tablet 360 Tablet 1     Sig: Take 1 Tablet by mouth three (3) times daily.

## 2022-09-28 NOTE — TELEPHONE ENCOUNTER
Caller requests Refill of:  busPIRone (BUSPAR) 7.5 mg tablet      Please send to: [facility]  ([address] [facility], [address]  [phone]      Visit Appointment History:   Future:   Declined to schedule  Previous: 6/30/22      Caller confirmed instructions and dosages as correct. Caller was advised that Meds will be refilled as soon as possible, however there can be a 48-72 business hour turn around on refill requests.

## 2022-09-30 RX ORDER — BUSPIRONE HYDROCHLORIDE 7.5 MG/1
7.5 TABLET ORAL 3 TIMES DAILY
Qty: 360 TABLET | Refills: 1 | Status: SHIPPED | OUTPATIENT
Start: 2022-09-30

## 2022-10-01 ENCOUNTER — TELEPHONE (OUTPATIENT)
Dept: INTERNAL MEDICINE CLINIC | Age: 77
End: 2022-10-01

## 2022-10-01 RX ORDER — HYDROXYZINE 25 MG/1
25 TABLET, FILM COATED ORAL 3 TIMES DAILY
Qty: 30 TABLET | Refills: 0 | Status: SHIPPED | OUTPATIENT
Start: 2022-10-01 | End: 2022-10-11

## 2022-10-01 RX ORDER — METHYLPREDNISOLONE 4 MG/1
TABLET ORAL
Qty: 1 DOSE PACK | Refills: 0 | Status: SHIPPED | OUTPATIENT
Start: 2022-10-01

## 2022-10-03 NOTE — TELEPHONE ENCOUNTER
He has had poison ivy \"driving me crazy\" for a week, extensively over arms. Requests oral medication. Orders Placed This Encounter    methylPREDNISolone (MEDROL DOSEPACK) 4 mg tablet     Sig: Take as directed     Dispense:  1 Dose Pack     Refill:  0    hydrOXYzine HCL (ATARAX) 25 mg tablet     Sig: Take 1 Tablet by mouth three (3) times daily for 10 days.      Dispense:  30 Tablet     Refill:  0

## 2022-11-04 RX ORDER — METOPROLOL TARTRATE 25 MG/1
TABLET, FILM COATED ORAL
Qty: 90 TABLET | Refills: 3 | OUTPATIENT
Start: 2022-11-04

## 2022-11-09 DIAGNOSIS — F41.9 ANXIETY AND DEPRESSION: ICD-10-CM

## 2022-11-09 DIAGNOSIS — F32.A ANXIETY AND DEPRESSION: ICD-10-CM

## 2022-11-11 RX ORDER — BUPROPION HYDROCHLORIDE 150 MG/1
TABLET ORAL
Qty: 90 TABLET | Refills: 3 | Status: SHIPPED | OUTPATIENT
Start: 2022-11-11

## 2022-12-29 DIAGNOSIS — F32.A ANXIETY AND DEPRESSION: ICD-10-CM

## 2022-12-29 DIAGNOSIS — F41.9 ANXIETY AND DEPRESSION: ICD-10-CM

## 2022-12-29 RX ORDER — BUSPIRONE HYDROCHLORIDE 7.5 MG/1
7.5 TABLET ORAL 3 TIMES DAILY
Qty: 30 TABLET | Refills: 1 | Status: SHIPPED | OUTPATIENT
Start: 2022-12-29

## 2022-12-29 NOTE — TELEPHONE ENCOUNTER
Future Appointments:  No future appointments. Last Appointment With Me:  Visit date not found     Requested Prescriptions     Pending Prescriptions Disp Refills    busPIRone (BUSPAR) 7.5 mg tablet 30 Tablet 1     Sig: Take 1 Tablet by mouth three (3) times daily.

## 2022-12-30 DIAGNOSIS — F41.9 ANXIETY AND DEPRESSION: ICD-10-CM

## 2022-12-30 DIAGNOSIS — F32.A ANXIETY AND DEPRESSION: ICD-10-CM

## 2022-12-30 NOTE — TELEPHONE ENCOUNTER
busPIRone (BUSPAR) 7.5 mg tablet      States that the med was sent in incorrectly    States it should have been a 90 day supply    Instead,  a 10 day supply was sent in      Can we please send in the 90 day supply

## 2023-01-04 RX ORDER — BUSPIRONE HYDROCHLORIDE 7.5 MG/1
7.5 TABLET ORAL 3 TIMES DAILY
Qty: 360 TABLET | Refills: 1 | Status: SHIPPED | OUTPATIENT
Start: 2023-01-04

## 2023-01-04 NOTE — TELEPHONE ENCOUNTER
Pt states that medication has to go for the 90 days. He will only get enough for 10 days and that is not going to work. He needs this medication daily. There needs to be a script sent to equal the #180 pills OR a whole new script needs to be sent. Pt is in need of medication. Can this be done today?

## 2023-01-05 DIAGNOSIS — F32.A ANXIETY AND DEPRESSION: ICD-10-CM

## 2023-01-05 DIAGNOSIS — F41.9 ANXIETY AND DEPRESSION: ICD-10-CM

## 2023-01-05 NOTE — TELEPHONE ENCOUNTER
PCP: Bhavana Moulton MD    Last appt: 6/30/2022  No future appointments. Requested Prescriptions     Pending Prescriptions Disp Refills    busPIRone (BUSPAR) 7.5 mg tablet 270 Tablet 3     Sig: Take 1 Tablet by mouth three (3) times daily.

## 2023-01-05 NOTE — TELEPHONE ENCOUNTER
Pt states that now the medication that there is a problem with has went to the pharmacy for #60 pills. Pt continues to have to pay for these partial shipments as they are under the 90 day refill. Pt is asking how he can get you to understand what is needed? Pt needs a 90 day supply of medication, Buspirone. He can not afford to keep paying for these little bits of medication being called in he states. Please call pt to let him know what can be done to make this right.

## 2023-01-09 RX ORDER — BUSPIRONE HYDROCHLORIDE 7.5 MG/1
7.5 TABLET ORAL 3 TIMES DAILY
Qty: 270 TABLET | Refills: 3 | Status: SHIPPED | OUTPATIENT
Start: 2023-01-09

## 2023-05-11 ENCOUNTER — PATIENT MESSAGE (OUTPATIENT)
Age: 78
End: 2023-05-11

## 2023-05-12 RX ORDER — BUPROPION HYDROCHLORIDE 150 MG/1
150 TABLET ORAL EVERY MORNING
Qty: 90 TABLET | Refills: 0 | Status: SHIPPED | OUTPATIENT
Start: 2023-05-12

## 2023-05-12 RX ORDER — ATORVASTATIN CALCIUM 80 MG/1
80 TABLET, FILM COATED ORAL DAILY
Qty: 90 TABLET | Refills: 0 | Status: SHIPPED | OUTPATIENT
Start: 2023-05-12

## 2023-05-12 RX ORDER — BUSPIRONE HYDROCHLORIDE 7.5 MG/1
7.5 TABLET ORAL 3 TIMES DAILY
Qty: 270 TABLET | Refills: 0 | Status: SHIPPED | OUTPATIENT
Start: 2023-05-12

## 2023-05-12 RX ORDER — AMLODIPINE BESYLATE 2.5 MG/1
2.5 TABLET ORAL DAILY
Qty: 90 TABLET | Refills: 0 | Status: SHIPPED | OUTPATIENT
Start: 2023-05-12

## 2023-05-12 RX ORDER — EZETIMIBE 10 MG/1
10 TABLET ORAL NIGHTLY
Qty: 90 TABLET | Refills: 0 | Status: SHIPPED | OUTPATIENT
Start: 2023-05-12

## 2023-05-12 NOTE — TELEPHONE ENCOUNTER
From: Vikki Cisse  To: Dr. Janet Chandler: 2023 11:03 AM EDT  Subject: medications    Would you please up date all of my medications and refills with [facility] thank you very much, Jersey Landrum. ..

## 2023-05-12 NOTE — TELEPHONE ENCOUNTER
Future Appointments:  No future appointments.      Last Appointment With Me:  6/30/2022     Requested Prescriptions     Pending Prescriptions Disp Refills    amLODIPine (NORVASC) 2.5 MG tablet 90 tablet 3     Sig: Take 1 tablet by mouth daily    atorvastatin (LIPITOR) 80 MG tablet 90 tablet 3     Sig: Take 1 tablet by mouth daily    buPROPion (WELLBUTRIN XL) 150 MG extended release tablet 90 tablet 3     Sig: Take 1 tablet by mouth every morning    busPIRone (BUSPAR) 7.5 MG tablet 270 tablet      Sig: Take 1 tablet by mouth 3 times daily    ezetimibe (ZETIA) 10 MG tablet 90 tablet 3     Sig: Take 1 tablet by mouth nightly    metoprolol tartrate (LOPRESSOR) 25 MG tablet 180 tablet 3     Sig: TAKE ONE-HALF TABLET BY  MOUTH TWICE DAILY

## 2023-07-07 DIAGNOSIS — E78.2 MIXED HYPERLIPIDEMIA: ICD-10-CM

## 2023-07-07 DIAGNOSIS — F32.A DEPRESSION, UNSPECIFIED DEPRESSION TYPE: ICD-10-CM

## 2023-07-07 DIAGNOSIS — F41.9 ANXIETY DISORDER, UNSPECIFIED TYPE: Primary | ICD-10-CM

## 2023-07-07 DIAGNOSIS — I25.10 CORONARY ARTERY DISEASE INVOLVING NATIVE CORONARY ARTERY OF NATIVE HEART WITHOUT ANGINA PECTORIS: ICD-10-CM

## 2023-07-10 NOTE — TELEPHONE ENCOUNTER
Future Appointments:  No future appointments.      Last Appointment With Me:  6/30/2022     Requested Prescriptions     Pending Prescriptions Disp Refills    atorvastatin (LIPITOR) 80 MG tablet [Pharmacy Med Name: Atorvastatin Calcium 80 MG Oral Tablet] 90 tablet 0     Sig: TAKE 1 TABLET BY MOUTH ONCE  DAILY    buPROPion (WELLBUTRIN XL) 150 MG extended release tablet [Pharmacy Med Name: buPROPion HCl ER (XL) 150 MG Oral Tablet Extended Release 24 Hour] 90 tablet 0     Sig: TAKE 1 TABLET BY MOUTH IN THE  MORNING    amLODIPine (NORVASC) 2.5 MG tablet [Pharmacy Med Name: amLODIPine Besylate 2.5 MG Oral Tablet] 90 tablet 0     Sig: TAKE 1 TABLET BY MOUTH DAILY    ezetimibe (ZETIA) 10 MG tablet [Pharmacy Med Name: Ezetimibe 10 MG Oral Tablet] 90 tablet 0     Sig: TAKE 1 TABLET BY MOUTH AT NIGHT

## 2023-07-11 RX ORDER — BUPROPION HYDROCHLORIDE 150 MG/1
TABLET ORAL
Qty: 90 TABLET | Refills: 0 | Status: SHIPPED | OUTPATIENT
Start: 2023-07-11

## 2023-07-11 RX ORDER — ATORVASTATIN CALCIUM 80 MG/1
TABLET, FILM COATED ORAL
Qty: 90 TABLET | Refills: 2 | Status: SHIPPED | OUTPATIENT
Start: 2023-07-11

## 2023-07-11 RX ORDER — AMLODIPINE BESYLATE 2.5 MG/1
2.5 TABLET ORAL DAILY
Qty: 90 TABLET | Refills: 3 | Status: SHIPPED | OUTPATIENT
Start: 2023-07-11

## 2023-07-11 RX ORDER — EZETIMIBE 10 MG/1
TABLET ORAL
Qty: 90 TABLET | Refills: 3 | Status: SHIPPED | OUTPATIENT
Start: 2023-07-11

## 2023-09-25 DIAGNOSIS — F32.A DEPRESSION, UNSPECIFIED DEPRESSION TYPE: ICD-10-CM

## 2023-09-25 DIAGNOSIS — F41.9 ANXIETY DISORDER, UNSPECIFIED TYPE: ICD-10-CM

## 2023-09-25 NOTE — TELEPHONE ENCOUNTER
Future Appointments:  No future appointments. Request to assist in scheduling overdue follow up sent to [facility].      Last Appointment With Me:  6/30/2022     Requested Prescriptions     Pending Prescriptions Disp Refills    busPIRone (BUSPAR) 7.5 MG tablet [Pharmacy Med Name: BUSPIRONE  7.5MG  TAB] 270 tablet 0     Sig: Take 1 tablet by mouth 3 times daily    buPROPion (WELLBUTRIN XL) 150 MG extended release tablet [Pharmacy Med Name: buPROPion HCl ER (XL) 150 MG Oral Tablet Extended Release 24 Hour] 90 tablet 3     Sig: Take 1 tablet by mouth every morning

## 2023-09-26 RX ORDER — BUPROPION HYDROCHLORIDE 150 MG/1
150 TABLET ORAL EVERY MORNING
Qty: 90 TABLET | Refills: 3 | Status: SHIPPED | OUTPATIENT
Start: 2023-09-26

## 2023-09-26 RX ORDER — BUSPIRONE HYDROCHLORIDE 7.5 MG/1
7.5 TABLET ORAL 3 TIMES DAILY
Qty: 270 TABLET | Refills: 0 | Status: SHIPPED | OUTPATIENT
Start: 2023-09-26

## 2023-10-22 SDOH — HEALTH STABILITY: PHYSICAL HEALTH: ON AVERAGE, HOW MANY MINUTES DO YOU ENGAGE IN EXERCISE AT THIS LEVEL?: 40 MIN

## 2023-10-22 SDOH — ECONOMIC STABILITY: FOOD INSECURITY: WITHIN THE PAST 12 MONTHS, YOU WORRIED THAT YOUR FOOD WOULD RUN OUT BEFORE YOU GOT MONEY TO BUY MORE.: NEVER TRUE

## 2023-10-22 SDOH — HEALTH STABILITY: PHYSICAL HEALTH: ON AVERAGE, HOW MANY DAYS PER WEEK DO YOU ENGAGE IN MODERATE TO STRENUOUS EXERCISE (LIKE A BRISK WALK)?: 2 DAYS

## 2023-10-22 SDOH — ECONOMIC STABILITY: FOOD INSECURITY: WITHIN THE PAST 12 MONTHS, THE FOOD YOU BOUGHT JUST DIDN'T LAST AND YOU DIDN'T HAVE MONEY TO GET MORE.: NEVER TRUE

## 2023-10-22 SDOH — ECONOMIC STABILITY: INCOME INSECURITY: HOW HARD IS IT FOR YOU TO PAY FOR THE VERY BASICS LIKE FOOD, HOUSING, MEDICAL CARE, AND HEATING?: NOT HARD AT ALL

## 2023-10-22 SDOH — ECONOMIC STABILITY: HOUSING INSECURITY
IN THE LAST 12 MONTHS, WAS THERE A TIME WHEN YOU DID NOT HAVE A STEADY PLACE TO SLEEP OR SLEPT IN A SHELTER (INCLUDING NOW)?: NO

## 2023-10-22 ASSESSMENT — LIFESTYLE VARIABLES
HOW MANY STANDARD DRINKS CONTAINING ALCOHOL DO YOU HAVE ON A TYPICAL DAY: PATIENT DOES NOT DRINK
HOW MANY STANDARD DRINKS CONTAINING ALCOHOL DO YOU HAVE ON A TYPICAL DAY: 0
HOW OFTEN DO YOU HAVE SIX OR MORE DRINKS ON ONE OCCASION: 1
HOW OFTEN DO YOU HAVE A DRINK CONTAINING ALCOHOL: NEVER
HOW OFTEN DO YOU HAVE A DRINK CONTAINING ALCOHOL: 1

## 2023-10-22 ASSESSMENT — PATIENT HEALTH QUESTIONNAIRE - PHQ9
SUM OF ALL RESPONSES TO PHQ9 QUESTIONS 1 & 2: 0
SUM OF ALL RESPONSES TO PHQ QUESTIONS 1-9: 0
1. LITTLE INTEREST OR PLEASURE IN DOING THINGS: 0
SUM OF ALL RESPONSES TO PHQ QUESTIONS 1-9: 0
2. FEELING DOWN, DEPRESSED OR HOPELESS: 0

## 2023-10-25 ENCOUNTER — TELEPHONE (OUTPATIENT)
Age: 78
End: 2023-10-25

## 2023-10-25 ENCOUNTER — OFFICE VISIT (OUTPATIENT)
Age: 78
End: 2023-10-25

## 2023-10-25 VITALS
BODY MASS INDEX: 24.29 KG/M2 | HEART RATE: 50 BPM | DIASTOLIC BLOOD PRESSURE: 72 MMHG | RESPIRATION RATE: 18 BRPM | HEIGHT: 69 IN | TEMPERATURE: 97.3 F | SYSTOLIC BLOOD PRESSURE: 128 MMHG | OXYGEN SATURATION: 98 % | WEIGHT: 164 LBS

## 2023-10-25 DIAGNOSIS — Z95.1 S/P CABG X 4: ICD-10-CM

## 2023-10-25 DIAGNOSIS — Z51.81 MEDICATION MONITORING ENCOUNTER: ICD-10-CM

## 2023-10-25 DIAGNOSIS — Z00.00 MEDICARE ANNUAL WELLNESS VISIT, SUBSEQUENT: Primary | ICD-10-CM

## 2023-10-25 DIAGNOSIS — F41.9 ANXIETY DISORDER, UNSPECIFIED TYPE: ICD-10-CM

## 2023-10-25 DIAGNOSIS — F32.A DEPRESSION, UNSPECIFIED DEPRESSION TYPE: ICD-10-CM

## 2023-10-25 DIAGNOSIS — E78.2 MIXED HYPERLIPIDEMIA: ICD-10-CM

## 2023-10-25 NOTE — TELEPHONE ENCOUNTER
----- Message from Candance Lory sent at 10/25/2023  3:22 PM EDT -----  Subject: Message to Provider    QUESTIONS  Information for Provider? VERY IMPORTANT? Patient was in today (10/25) and   needs a nurse to call him regarding EXACT instructions on when and for how   long to take his BP. Also, an Rx was sent to [facility] that he wants cancelled. He uses [facility].  ---------------------------------------------------------------------------  --------------  [facility]  [phone]; OK to leave message on voicemail  ---------------------------------------------------------------------------  --------------  SCRIPT ANSWERS  Relationship to Patient?  Self

## 2023-10-25 NOTE — PATIENT INSTRUCTIONS
appropriate. After reviewing your medical record and screening and assessments performed today your provider may have ordered immunizations, labs, imaging, and/or referrals for you. A list of these orders (if applicable) as well as your Preventive Care list are included within your After Visit Summary for your review. Other Preventive Recommendations:    A preventive eye exam performed by an eye specialist is recommended every 1-2 years to screen for glaucoma; cataracts, macular degeneration, and other eye disorders. A preventive dental visit is recommended every 6 months. Try to get at least 150 minutes of exercise per week or 10,000 steps per day on a pedometer . Order or download the FREE \"Exercise & Physical Activity: Your Everyday Guide\" from The "[employer]. Call 1-274.457.5132 or search The "[employer] online. You need 6376-5048 mg of calcium and 7287-6527 IU of vitamin D per day. It is possible to meet your calcium requirement with diet alone, but a vitamin D supplement is usually necessary to meet this goal.  When exposed to the sun, use a sunscreen that protects against both UVA and UVB radiation with an SPF of 30 or greater. Reapply every 2 to 3 hours or after sweating, drying off with a towel, or swimming. Always wear a seat belt when traveling in a car. Always wear a helmet when riding a bicycle or motorcycle.

## 2023-10-26 ENCOUNTER — NURSE ONLY (OUTPATIENT)
Age: 78
End: 2023-10-26

## 2023-10-26 ENCOUNTER — TELEPHONE (OUTPATIENT)
Age: 78
End: 2023-10-26

## 2023-10-26 DIAGNOSIS — E78.2 MIXED HYPERLIPIDEMIA: ICD-10-CM

## 2023-10-26 DIAGNOSIS — Z51.81 MEDICATION MONITORING ENCOUNTER: ICD-10-CM

## 2023-10-26 NOTE — TELEPHONE ENCOUNTER
----- Message from Anel Yeung sent at 10/25/2023  3:22 PM EDT -----  Subject: Message to Provider    QUESTIONS  Information for Provider? VERY IMPORTANT? Patient was in today (10/25) and   needs a nurse to call him regarding EXACT instructions on when and for how   long to take his BP. Also, an Rx was sent to [facility] that he wants cancelled. He uses [facility].  ---------------------------------------------------------------------------  --------------  [facility]  [phone]; OK to leave message on voicemail  ---------------------------------------------------------------------------  --------------  SCRIPT ANSWERS  Relationship to Patient?  Self

## 2023-10-26 NOTE — TELEPHONE ENCOUNTER
The patient came to the office to get his labs completed, he wanted to know if  sent in his Rx to his mail order pharmacy? I told him yes. He also wanted to know if he should double his antidepressant. Per , he is to check his BP and pulse for 1 week. Once she knows what his BP and HR is running, she will make a decision. Patient verbalized understanding of information discussed w/ no further questions at this time.

## 2023-10-27 LAB
BASOPHILS # BLD: NORMAL K/UL (ref 0–0.1)
BASOPHILS NFR BLD: NORMAL % (ref 0–1)
DIFFERENTIAL METHOD BLD: NORMAL
EOSINOPHIL # BLD: NORMAL K/UL (ref 0–0.4)
EOSINOPHIL NFR BLD: NORMAL % (ref 0–7)
ERYTHROCYTE [DISTWIDTH] IN BLOOD BY AUTOMATED COUNT: NORMAL % (ref 11.5–14.5)
HCT VFR BLD AUTO: NORMAL % (ref 36.6–50.3)
HGB BLD-MCNC: NORMAL G/DL (ref 12.1–17)
IMM GRANULOCYTES # BLD AUTO: NORMAL K/UL (ref 0–0.04)
IMM GRANULOCYTES NFR BLD AUTO: NORMAL % (ref 0–0.5)
LYMPHOCYTES # BLD: NORMAL K/UL (ref 0.8–3.5)
LYMPHOCYTES NFR BLD: NORMAL % (ref 12–49)
MCH RBC QN AUTO: NORMAL PG (ref 26–34)
MCHC RBC AUTO-ENTMCNC: NORMAL G/DL (ref 30–36.5)
MCV RBC AUTO: NORMAL FL (ref 80–99)
MONOCYTES # BLD: NORMAL K/UL (ref 0–1)
MONOCYTES NFR BLD: NORMAL % (ref 5–13)
NEUTS SEG # BLD: NORMAL K/UL (ref 1.8–8)
NEUTS SEG NFR BLD: NORMAL % (ref 32–75)
NRBC # BLD: NORMAL K/UL (ref 0–0.01)
NRBC BLD-RTO: NORMAL PER 100 WBC
PLATELET # BLD AUTO: NORMAL K/UL (ref 150–400)
PMV BLD AUTO: NORMAL FL (ref 8.9–12.9)
RBC # BLD AUTO: NORMAL M/UL (ref 4.1–5.7)
WBC # BLD AUTO: NORMAL K/UL (ref 4.1–11.1)

## 2023-10-30 ENCOUNTER — TELEPHONE (OUTPATIENT)
Age: 78
End: 2023-10-30

## 2023-10-30 DIAGNOSIS — E78.2 MIXED HYPERLIPIDEMIA: ICD-10-CM

## 2023-10-30 DIAGNOSIS — I25.10 CORONARY ARTERY DISEASE INVOLVING NATIVE CORONARY ARTERY OF NATIVE HEART WITHOUT ANGINA PECTORIS: Primary | ICD-10-CM

## 2023-10-30 NOTE — TELEPHONE ENCOUNTER
Patient called in to request a new lab order for [facility]   Patients labs completed in our office they were unable to get results

## 2023-11-01 LAB
BASOPHILS # BLD AUTO: 0.1 X10E3/UL (ref 0–0.2)
BASOPHILS NFR BLD AUTO: 1 %
EOSINOPHIL # BLD AUTO: 0.2 X10E3/UL (ref 0–0.4)
EOSINOPHIL NFR BLD AUTO: 3 %
ERYTHROCYTE [DISTWIDTH] IN BLOOD BY AUTOMATED COUNT: 13 % (ref 11.6–15.4)
HCT VFR BLD AUTO: 40.3 % (ref 37.5–51)
HGB BLD-MCNC: 13.6 G/DL (ref 13–17.7)
IMM GRANULOCYTES # BLD AUTO: 0 X10E3/UL (ref 0–0.1)
IMM GRANULOCYTES NFR BLD AUTO: 0 %
LYMPHOCYTES # BLD AUTO: 1.2 X10E3/UL (ref 0.7–3.1)
LYMPHOCYTES NFR BLD AUTO: 20 %
MCH RBC QN AUTO: 32.5 PG (ref 26.6–33)
MCHC RBC AUTO-ENTMCNC: 33.7 G/DL (ref 31.5–35.7)
MCV RBC AUTO: 96 FL (ref 79–97)
MONOCYTES # BLD AUTO: 0.7 X10E3/UL (ref 0.1–0.9)
MONOCYTES NFR BLD AUTO: 12 %
NEUTROPHILS # BLD AUTO: 3.7 X10E3/UL (ref 1.4–7)
NEUTROPHILS NFR BLD AUTO: 64 %
PLATELET # BLD AUTO: 289 X10E3/UL (ref 150–450)
RBC # BLD AUTO: 4.19 X10E6/UL (ref 4.14–5.8)
WBC # BLD AUTO: 5.8 X10E3/UL (ref 3.4–10.8)

## 2024-01-01 NOTE — TELEPHONE ENCOUNTER
----- Message from [address], [place] sent at 5/31/2017 10:03 PM EDT -----  Lipids at goal. Potassium high.  Repeat BMP. thx.
----- Message from Jaquan Collazo MD sent at 5/31/2017 10:03 PM EDT -----  Lipids at goal. Potassium high.  Repeat BMP. thx.
Left message for patient to return my call.
Pt returned your call.   Thanks
Verified patient with two identifiers. Spoke with patient regarding test results. Pt will come by office to get lab slip.
Yes

## 2024-01-08 DIAGNOSIS — E78.2 MIXED HYPERLIPIDEMIA: ICD-10-CM

## 2024-01-11 DIAGNOSIS — E78.2 MIXED HYPERLIPIDEMIA: ICD-10-CM

## 2024-01-11 RX ORDER — ATORVASTATIN CALCIUM 80 MG/1
TABLET, FILM COATED ORAL
Qty: 100 TABLET | Refills: 2 | Status: SHIPPED | OUTPATIENT
Start: 2024-01-11 | End: 2024-01-13

## 2024-01-13 RX ORDER — ATORVASTATIN CALCIUM 80 MG/1
TABLET, FILM COATED ORAL
Qty: 100 TABLET | Refills: 2 | Status: SHIPPED | OUTPATIENT
Start: 2024-01-13

## 2024-01-25 ENCOUNTER — OFFICE VISIT (OUTPATIENT)
Age: 79
End: 2024-01-25
Payer: MEDICARE

## 2024-01-25 VITALS
BODY MASS INDEX: 24.29 KG/M2 | RESPIRATION RATE: 20 BRPM | OXYGEN SATURATION: 96 % | WEIGHT: 164 LBS | DIASTOLIC BLOOD PRESSURE: 83 MMHG | SYSTOLIC BLOOD PRESSURE: 128 MMHG | TEMPERATURE: 97.2 F | HEART RATE: 70 BPM | HEIGHT: 69 IN

## 2024-01-25 DIAGNOSIS — F41.9 ANXIETY DISORDER, UNSPECIFIED TYPE: ICD-10-CM

## 2024-01-25 DIAGNOSIS — F32.A DEPRESSION, UNSPECIFIED DEPRESSION TYPE: Primary | ICD-10-CM

## 2024-01-25 DIAGNOSIS — Z51.81 MEDICATION MONITORING ENCOUNTER: ICD-10-CM

## 2024-01-25 PROCEDURE — 1123F ACP DISCUSS/DSCN MKR DOCD: CPT | Performed by: FAMILY MEDICINE

## 2024-01-25 PROCEDURE — 99214 OFFICE O/P EST MOD 30 MIN: CPT | Performed by: FAMILY MEDICINE

## 2024-01-25 RX ORDER — BUSPIRONE HYDROCHLORIDE 7.5 MG/1
7.5 TABLET ORAL 3 TIMES DAILY
Qty: 270 TABLET | Refills: 0 | Status: SHIPPED | OUTPATIENT
Start: 2024-01-25

## 2024-01-25 ASSESSMENT — PATIENT HEALTH QUESTIONNAIRE - PHQ9
10. IF YOU CHECKED OFF ANY PROBLEMS, HOW DIFFICULT HAVE THESE PROBLEMS MADE IT FOR YOU TO DO YOUR WORK, TAKE CARE OF THINGS AT HOME, OR GET ALONG WITH OTHER PEOPLE: 0
1. LITTLE INTEREST OR PLEASURE IN DOING THINGS: 0
6. FEELING BAD ABOUT YOURSELF - OR THAT YOU ARE A FAILURE OR HAVE LET YOURSELF OR YOUR FAMILY DOWN: 0
4. FEELING TIRED OR HAVING LITTLE ENERGY: 0
7. TROUBLE CONCENTRATING ON THINGS, SUCH AS READING THE NEWSPAPER OR WATCHING TELEVISION: 0
SUM OF ALL RESPONSES TO PHQ9 QUESTIONS 1 & 2: 1
2. FEELING DOWN, DEPRESSED OR HOPELESS: 1
SUM OF ALL RESPONSES TO PHQ QUESTIONS 1-9: 1
SUM OF ALL RESPONSES TO PHQ QUESTIONS 1-9: 1
5. POOR APPETITE OR OVEREATING: 0
SUM OF ALL RESPONSES TO PHQ QUESTIONS 1-9: 1
8. MOVING OR SPEAKING SO SLOWLY THAT OTHER PEOPLE COULD HAVE NOTICED. OR THE OPPOSITE, BEING SO FIGETY OR RESTLESS THAT YOU HAVE BEEN MOVING AROUND A LOT MORE THAN USUAL: 0
3. TROUBLE FALLING OR STAYING ASLEEP: 0
SUM OF ALL RESPONSES TO PHQ QUESTIONS 1-9: 1
9. THOUGHTS THAT YOU WOULD BE BETTER OFF DEAD, OR OF HURTING YOURSELF: 0

## 2024-01-25 NOTE — PROGRESS NOTES
1. \"Have you been to the ER, urgent care clinic since your last visit?  Hospitalized since your last visit?\" No    2. \"Have you seen or consulted any other health care providers outside of the [facility] since your last visit?\" No     3. For patients aged 45-75: Has the patient had a colonoscopy / FIT/ Cologuard? NA - based on age      If the patient is female:    4. For patients aged 40-74: Has the patient had a mammogram within the past 2 years? NA - based on age or sex      5. For patients aged 21-65: Has the patient had a pap smear? NA - based on age or sex

## 2024-01-25 NOTE — PROGRESS NOTES
SUBJECTIVE:   Mr. Dominguez Roberson is a 78 y.o. male who is here for follow up of routine medical issues.  Pt is not fasting today.     HTN: Pt is compliant in taking amlodipine 2.5mg daily, metoprolol tartrate 25mg daily. Pt's BP in the office today was 128/33. Pt complains of dizziness. Pt states there was an episode where he was helping his son of his boat and felt dizziness while getting up. Pt saw . Dr. ALIYAH Yang (Cardiology) and said they performed a cardiogram. Per pt, Dr. ALIYAH Yang told him that he should not worry.     HLD: Pt is complaint in taking ezetimibe 10mg nightly. His last lipid panel was normal on 01/20/2022 .     Pt states the Wellbutrin XL 300mg daily has been helping his mood. Pt says he has been doing well. Pt was wondering if he should take an extra half tablet in March. Pt states he learned more about his wife's childhood and it has been hard for him.    Pt is requesting a refill on buspirone 7.5mg TID. Pt states the buspirone has been helping. He feels as if his \"mind is quiet\" and he is able to focus better.     At this time, he is otherwise doing well and has brought no other complaints to my attention today.  For a list of the medical issues addressed today, see the assessment and plan below.    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    All: is allergic to tramadol.   MEDS:   Current Outpatient Medications   Medication Sig    busPIRone (BUSPAR) 7.5 MG tablet Take 1 tablet by mouth 3 times daily    atorvastatin

## 2024-02-01 LAB
ALBUMIN SERPL-MCNC: 4.3 G/DL (ref 3.8–4.8)
ALBUMIN/GLOB SERPL: 2 {RATIO} (ref 1.2–2.2)
ALP SERPL-CCNC: 81 IU/L (ref 44–121)
ALT SERPL-CCNC: 23 IU/L (ref 0–44)
AST SERPL-CCNC: 27 IU/L (ref 0–40)
BILIRUB SERPL-MCNC: 0.7 MG/DL (ref 0–1.2)
BUN SERPL-MCNC: 22 MG/DL (ref 8–27)
BUN/CREAT SERPL: 18 (ref 10–24)
CALCIUM SERPL-MCNC: 9 MG/DL (ref 8.6–10.2)
CHLORIDE SERPL-SCNC: 105 MMOL/L (ref 96–106)
CO2 SERPL-SCNC: 21 MMOL/L (ref 20–29)
CREAT SERPL-MCNC: 1.24 MG/DL (ref 0.76–1.27)
EGFRCR SERPLBLD CKD-EPI 2021: 60 ML/MIN/1.73
GLOBULIN SER CALC-MCNC: 2.2 G/DL (ref 1.5–4.5)
GLUCOSE SERPL-MCNC: 104 MG/DL (ref 70–99)
POTASSIUM SERPL-SCNC: 4.7 MMOL/L (ref 3.5–5.2)
PROT SERPL-MCNC: 6.5 G/DL (ref 6–8.5)
SODIUM SERPL-SCNC: 141 MMOL/L (ref 134–144)

## 2024-02-29 ENCOUNTER — TELEPHONE (OUTPATIENT)
Age: 79
End: 2024-02-29

## 2024-02-29 DIAGNOSIS — F32.A DEPRESSION, UNSPECIFIED DEPRESSION TYPE: ICD-10-CM

## 2024-02-29 DIAGNOSIS — F41.9 ANXIETY DISORDER, UNSPECIFIED TYPE: ICD-10-CM

## 2024-02-29 NOTE — TELEPHONE ENCOUNTER
buPROPion (WELLBUTRIN XL) 150 MG extended release tablet    90 day mail order [facility]       Pt states that doctor was to speak with another physician and see if this medication was to have been increased.  Did Dr. Kim still want to do that?

## 2024-03-01 NOTE — TELEPHONE ENCOUNTER
I spoke with Mr. Prajapati regarding his dose of metoprolol and the Wellbutrin.  He is currently taking a half a tablet 25 mg of metoprolol daily.  Currently he feels that the depression has improved compared to his state during his last appointment.  Based on the recorded heart rate readings he will remain on the current dose of metoprolol and Wellbutrin.

## 2024-04-12 DIAGNOSIS — I25.10 CORONARY ARTERY DISEASE INVOLVING NATIVE CORONARY ARTERY OF NATIVE HEART WITHOUT ANGINA PECTORIS: ICD-10-CM

## 2024-04-12 DIAGNOSIS — E78.2 MIXED HYPERLIPIDEMIA: ICD-10-CM

## 2024-04-16 RX ORDER — EZETIMIBE 10 MG/1
TABLET ORAL
Qty: 100 TABLET | Refills: 2 | Status: SHIPPED | OUTPATIENT
Start: 2024-04-16

## 2024-04-16 RX ORDER — AMLODIPINE BESYLATE 2.5 MG/1
2.5 TABLET ORAL DAILY
Qty: 100 TABLET | Refills: 2 | Status: SHIPPED | OUTPATIENT
Start: 2024-04-16

## 2024-05-31 DIAGNOSIS — F41.9 ANXIETY DISORDER, UNSPECIFIED TYPE: ICD-10-CM

## 2024-05-31 DIAGNOSIS — F32.A DEPRESSION, UNSPECIFIED DEPRESSION TYPE: ICD-10-CM

## 2024-05-31 NOTE — TELEPHONE ENCOUNTER
Patient states he needs refill done thru [facility] for his BusPIRone (BUSPAR) 7.5 MG tablet  Quantity 270 Tablets. Please call if any questions. Thank you    Patient reminded of [address] on refills

## 2024-05-31 NOTE — TELEPHONE ENCOUNTER
PCP: Annette Kim MD    Last appt:   1/25/2024    Future Appointments   Date Time Provider Department Center   10/28/2024 11:00 AM Annette Kim MD [facility]       Requested Prescriptions     Pending Prescriptions Disp Refills    buPROPion (WELLBUTRIN XL) 150 MG extended release tablet 270 tablet 3     Sig: Take 1 tablet by mouth every morning

## 2024-06-01 RX ORDER — BUPROPION HYDROCHLORIDE 150 MG/1
150 TABLET ORAL EVERY MORNING
Qty: 90 TABLET | Refills: 3 | Status: SHIPPED | OUTPATIENT
Start: 2024-06-01

## 2024-06-01 RX ORDER — BUSPIRONE HYDROCHLORIDE 7.5 MG/1
7.5 TABLET ORAL 3 TIMES DAILY
Qty: 270 TABLET | Refills: 3 | Status: SHIPPED | OUTPATIENT
Start: 2024-06-01

## 2024-09-30 ENCOUNTER — TELEPHONE (OUTPATIENT)
Dept: PHARMACY | Facility: CLINIC | Age: 79
End: 2024-09-30

## 2024-09-30 NOTE — TELEPHONE ENCOUNTER
bottle. He will call [facility] when he needs a refill.     Last Visit: 24  Next Visit: 10.28.24    Dianne Sands [facility]  Population Health    [facility]   [phone] option 1     For Pharmacy Admin Tracking Only    Program: [employer]  CPA in place:  No  Recommendation Provided To: Patient/Caregiver: 1 via Telephone  Intervention Detail: Adherence Monitorin  Intervention Accepted By: Patient/Caregiver: 1  Gap Closed?: No   Time Spent (min): 30

## 2024-10-25 SDOH — ECONOMIC STABILITY: INCOME INSECURITY: HOW HARD IS IT FOR YOU TO PAY FOR THE VERY BASICS LIKE FOOD, HOUSING, MEDICAL CARE, AND HEATING?: NOT VERY HARD

## 2024-10-25 SDOH — ECONOMIC STABILITY: FOOD INSECURITY: WITHIN THE PAST 12 MONTHS, YOU WORRIED THAT YOUR FOOD WOULD RUN OUT BEFORE YOU GOT MONEY TO BUY MORE.: NEVER TRUE

## 2024-10-25 SDOH — ECONOMIC STABILITY: FOOD INSECURITY: WITHIN THE PAST 12 MONTHS, THE FOOD YOU BOUGHT JUST DIDN'T LAST AND YOU DIDN'T HAVE MONEY TO GET MORE.: NEVER TRUE

## 2024-10-25 SDOH — ECONOMIC STABILITY: TRANSPORTATION INSECURITY
IN THE PAST 12 MONTHS, HAS LACK OF TRANSPORTATION KEPT YOU FROM MEETINGS, WORK, OR FROM GETTING THINGS NEEDED FOR DAILY LIVING?: NO

## 2024-11-11 SDOH — HEALTH STABILITY: PHYSICAL HEALTH: ON AVERAGE, HOW MANY DAYS PER WEEK DO YOU ENGAGE IN MODERATE TO STRENUOUS EXERCISE (LIKE A BRISK WALK)?: 4 DAYS

## 2024-11-11 SDOH — HEALTH STABILITY: PHYSICAL HEALTH: ON AVERAGE, HOW MANY MINUTES DO YOU ENGAGE IN EXERCISE AT THIS LEVEL?: 40 MIN

## 2024-11-11 ASSESSMENT — PATIENT HEALTH QUESTIONNAIRE - PHQ9
2. FEELING DOWN, DEPRESSED OR HOPELESS: MORE THAN HALF THE DAYS
SUM OF ALL RESPONSES TO PHQ QUESTIONS 1-9: 2
SUM OF ALL RESPONSES TO PHQ9 QUESTIONS 1 & 2: 2
SUM OF ALL RESPONSES TO PHQ QUESTIONS 1-9: 2
1. LITTLE INTEREST OR PLEASURE IN DOING THINGS: NOT AT ALL

## 2024-11-11 ASSESSMENT — LIFESTYLE VARIABLES
HOW OFTEN DO YOU HAVE A DRINK CONTAINING ALCOHOL: NEVER
HOW MANY STANDARD DRINKS CONTAINING ALCOHOL DO YOU HAVE ON A TYPICAL DAY: PATIENT DOES NOT DRINK

## 2024-11-14 SDOH — HEALTH STABILITY: PHYSICAL HEALTH: ON AVERAGE, HOW MANY MINUTES DO YOU ENGAGE IN EXERCISE AT THIS LEVEL?: 40 MIN

## 2024-11-14 SDOH — HEALTH STABILITY: PHYSICAL HEALTH: ON AVERAGE, HOW MANY DAYS PER WEEK DO YOU ENGAGE IN MODERATE TO STRENUOUS EXERCISE (LIKE A BRISK WALK)?: 4 DAYS

## 2024-11-14 ASSESSMENT — LIFESTYLE VARIABLES
HOW MANY STANDARD DRINKS CONTAINING ALCOHOL DO YOU HAVE ON A TYPICAL DAY: 0
HOW OFTEN DO YOU HAVE A DRINK CONTAINING ALCOHOL: 1
HOW OFTEN DO YOU HAVE A DRINK CONTAINING ALCOHOL: NEVER
HOW MANY STANDARD DRINKS CONTAINING ALCOHOL DO YOU HAVE ON A TYPICAL DAY: PATIENT DOES NOT DRINK
HOW OFTEN DO YOU HAVE SIX OR MORE DRINKS ON ONE OCCASION: 1

## 2024-11-14 ASSESSMENT — PATIENT HEALTH QUESTIONNAIRE - PHQ9
2. FEELING DOWN, DEPRESSED OR HOPELESS: MORE THAN HALF THE DAYS
1. LITTLE INTEREST OR PLEASURE IN DOING THINGS: NOT AT ALL
SUM OF ALL RESPONSES TO PHQ QUESTIONS 1-9: 2
SUM OF ALL RESPONSES TO PHQ9 QUESTIONS 1 & 2: 2
SUM OF ALL RESPONSES TO PHQ QUESTIONS 1-9: 2

## 2024-11-27 ENCOUNTER — OFFICE VISIT (OUTPATIENT)
Age: 79
End: 2024-11-27
Payer: MEDICARE

## 2024-11-27 VITALS
TEMPERATURE: 98.1 F | BODY MASS INDEX: 24.29 KG/M2 | HEIGHT: 69 IN | DIASTOLIC BLOOD PRESSURE: 78 MMHG | WEIGHT: 164 LBS | SYSTOLIC BLOOD PRESSURE: 138 MMHG | HEART RATE: 54 BPM | RESPIRATION RATE: 18 BRPM | OXYGEN SATURATION: 99 %

## 2024-11-27 DIAGNOSIS — Z00.00 MEDICARE ANNUAL WELLNESS VISIT, SUBSEQUENT: Primary | ICD-10-CM

## 2024-11-27 DIAGNOSIS — Z13.6 SCREENING FOR CARDIOVASCULAR CONDITION: ICD-10-CM

## 2024-11-27 DIAGNOSIS — Z51.81 MEDICATION MONITORING ENCOUNTER: ICD-10-CM

## 2024-11-27 DIAGNOSIS — Z12.5 PROSTATE CANCER SCREENING: ICD-10-CM

## 2024-11-27 PROCEDURE — 1159F MED LIST DOCD IN RCRD: CPT | Performed by: FAMILY MEDICINE

## 2024-11-27 PROCEDURE — G0439 PPPS, SUBSEQ VISIT: HCPCS | Performed by: FAMILY MEDICINE

## 2024-11-27 PROCEDURE — 1123F ACP DISCUSS/DSCN MKR DOCD: CPT | Performed by: FAMILY MEDICINE

## 2024-11-27 SDOH — ECONOMIC STABILITY: FOOD INSECURITY: WITHIN THE PAST 12 MONTHS, YOU WORRIED THAT YOUR FOOD WOULD RUN OUT BEFORE YOU GOT MONEY TO BUY MORE.: NEVER TRUE

## 2024-11-27 SDOH — ECONOMIC STABILITY: FOOD INSECURITY: WITHIN THE PAST 12 MONTHS, THE FOOD YOU BOUGHT JUST DIDN'T LAST AND YOU DIDN'T HAVE MONEY TO GET MORE.: NEVER TRUE

## 2024-11-27 SDOH — ECONOMIC STABILITY: INCOME INSECURITY: HOW HARD IS IT FOR YOU TO PAY FOR THE VERY BASICS LIKE FOOD, HOUSING, MEDICAL CARE, AND HEATING?: NOT HARD AT ALL

## 2024-11-27 ASSESSMENT — PATIENT HEALTH QUESTIONNAIRE - PHQ9
SUM OF ALL RESPONSES TO PHQ QUESTIONS 1-9: 0
SUM OF ALL RESPONSES TO PHQ QUESTIONS 1-9: 0
3. TROUBLE FALLING OR STAYING ASLEEP: NOT AT ALL
8. MOVING OR SPEAKING SO SLOWLY THAT OTHER PEOPLE COULD HAVE NOTICED. OR THE OPPOSITE, BEING SO FIGETY OR RESTLESS THAT YOU HAVE BEEN MOVING AROUND A LOT MORE THAN USUAL: NOT AT ALL
2. FEELING DOWN, DEPRESSED OR HOPELESS: NOT AT ALL
5. POOR APPETITE OR OVEREATING: NOT AT ALL
4. FEELING TIRED OR HAVING LITTLE ENERGY: NOT AT ALL
7. TROUBLE CONCENTRATING ON THINGS, SUCH AS READING THE NEWSPAPER OR WATCHING TELEVISION: NOT AT ALL
SUM OF ALL RESPONSES TO PHQ QUESTIONS 1-9: 0
1. LITTLE INTEREST OR PLEASURE IN DOING THINGS: NOT AT ALL
SUM OF ALL RESPONSES TO PHQ9 QUESTIONS 1 & 2: 0
SUM OF ALL RESPONSES TO PHQ QUESTIONS 1-9: 0
9. THOUGHTS THAT YOU WOULD BE BETTER OFF DEAD, OR OF HURTING YOURSELF: NOT AT ALL
6. FEELING BAD ABOUT YOURSELF - OR THAT YOU ARE A FAILURE OR HAVE LET YOURSELF OR YOUR FAMILY DOWN: NOT AT ALL
10. IF YOU CHECKED OFF ANY PROBLEMS, HOW DIFFICULT HAVE THESE PROBLEMS MADE IT FOR YOU TO DO YOUR WORK, TAKE CARE OF THINGS AT HOME, OR GET ALONG WITH OTHER PEOPLE: NOT DIFFICULT AT ALL

## 2024-11-27 NOTE — PROGRESS NOTES
SUBJECTIVE:   Mr. Dominguez Roberson is a 79 y.o. male who is here for [facility].    HTN: Patient is compliant in taking amlodipine 2.5 mg daily and metoprolol tartrate 25 mg daily. Their BP today was 138/78.    HLD: Pt is compliant in taking ezetimibe 10 mg daily and atorvastatin 80 mg. Their last lipid panel was normal on 01/20/2022.    Pt states that when he took tramadol once he was fine then he took it the following morning he took another dosage of tramadol and  he had an allergic reaction and was sent to the ED.      Pt specifically denies changes in vision or hearing, trouble with swallowing or taste, CP, SOB, heartburn or upset stomach, change in bowel habits, unusual joint or muscle pains, numbness or tingling in extremities, or skin lesions of concern.        At this time, he is otherwise doing well and has brought no other complaints to my attention today.  For a list of the medical issues addressed today, see the assessment and plan below.    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    All: is allergic to tramadol.   MEDS:   Current Outpatient Medications   Medication Sig    buPROPion (WELLBUTRIN XL) 150 MG extended release tablet Take 1 tablet by mouth every morning    busPIRone (BUSPAR) 7.5 MG tablet Take 1 tablet by mouth 3 times daily    ezetimibe (ZETIA) 10 MG tablet TAKE 1 TABLET BY MOUTH AT NIGHT    amLODIPine (NORVASC) 2.5 MG tablet TAKE 1 TABLET BY MOUTH DAILY    atorvastatin (LIPITOR) 80 MG tablet TAKE 1 TABLET BY MOUTH ONCE  DAILY

## 2024-12-03 LAB
BASOPHILS # BLD AUTO: 0 X10E3/UL (ref 0–0.2)
BASOPHILS NFR BLD AUTO: 1 %
EOSINOPHIL # BLD AUTO: 0.2 X10E3/UL (ref 0–0.4)
EOSINOPHIL NFR BLD AUTO: 3 %
ERYTHROCYTE [DISTWIDTH] IN BLOOD BY AUTOMATED COUNT: 12.4 % (ref 11.6–15.4)
HCT VFR BLD AUTO: 41.6 % (ref 37.5–51)
HGB BLD-MCNC: 13.4 G/DL (ref 13–17.7)
IMM GRANULOCYTES # BLD AUTO: 0 X10E3/UL (ref 0–0.1)
IMM GRANULOCYTES NFR BLD AUTO: 0 %
LYMPHOCYTES # BLD AUTO: 1.3 X10E3/UL (ref 0.7–3.1)
LYMPHOCYTES NFR BLD AUTO: 18 %
MCH RBC QN AUTO: 31.4 PG (ref 26.6–33)
MCHC RBC AUTO-ENTMCNC: 32.2 G/DL (ref 31.5–35.7)
MCV RBC AUTO: 97 FL (ref 79–97)
MONOCYTES # BLD AUTO: 0.9 X10E3/UL (ref 0.1–0.9)
MONOCYTES NFR BLD AUTO: 13 %
NEUTROPHILS # BLD AUTO: 4.6 X10E3/UL (ref 1.4–7)
NEUTROPHILS NFR BLD AUTO: 65 %
PLATELET # BLD AUTO: 311 X10E3/UL (ref 150–450)
RBC # BLD AUTO: 4.27 X10E6/UL (ref 4.14–5.8)
WBC # BLD AUTO: 7 X10E3/UL (ref 3.4–10.8)

## 2024-12-04 LAB
ALBUMIN SERPL-MCNC: 4.2 G/DL (ref 3.8–4.8)
ALP SERPL-CCNC: 87 IU/L (ref 44–121)
ALT SERPL-CCNC: 26 IU/L (ref 0–44)
AST SERPL-CCNC: 32 IU/L (ref 0–40)
BILIRUB SERPL-MCNC: 0.7 MG/DL (ref 0–1.2)
BUN SERPL-MCNC: 17 MG/DL (ref 8–27)
BUN/CREAT SERPL: 11 (ref 10–24)
CALCIUM SERPL-MCNC: 9.5 MG/DL (ref 8.6–10.2)
CHLORIDE SERPL-SCNC: 105 MMOL/L (ref 96–106)
CHOLEST SERPL-MCNC: 134 MG/DL (ref 100–199)
CO2 SERPL-SCNC: 22 MMOL/L (ref 20–29)
CREAT SERPL-MCNC: 1.53 MG/DL (ref 0.76–1.27)
EGFRCR SERPLBLD CKD-EPI 2021: 47 ML/MIN/1.73
GLOBULIN SER CALC-MCNC: 2.9 G/DL (ref 1.5–4.5)
GLUCOSE SERPL-MCNC: 112 MG/DL (ref 70–99)
HDLC SERPL-MCNC: 51 MG/DL
LDLC SERPL CALC-MCNC: 68 MG/DL (ref 0–99)
POTASSIUM SERPL-SCNC: 5.3 MMOL/L (ref 3.5–5.2)
PROT SERPL-MCNC: 7.1 G/DL (ref 6–8.5)
PSA SERPL-MCNC: 11 NG/ML (ref 0–4)
SODIUM SERPL-SCNC: 142 MMOL/L (ref 134–144)
TRIGL SERPL-MCNC: 75 MG/DL (ref 0–149)
VLDLC SERPL CALC-MCNC: 15 MG/DL (ref 5–40)

## 2024-12-16 ENCOUNTER — TELEPHONE (OUTPATIENT)
Age: 79
End: 2024-12-16

## 2024-12-16 DIAGNOSIS — R97.20 ELEVATED PSA: Primary | ICD-10-CM

## 2024-12-16 DIAGNOSIS — R79.89 ELEVATED SERUM CREATININE: ICD-10-CM

## 2024-12-16 NOTE — TELEPHONE ENCOUNTER
Patient was called to review recent lab results.  He is informed about his elevated PSA.  He was given a referral to urology and was scheduled to see Dr. Dash on January 9, 2025.

## 2024-12-17 ENCOUNTER — TELEPHONE (OUTPATIENT)
Age: 79
End: 2024-12-17

## 2024-12-17 DIAGNOSIS — Z95.1 S/P CABG X 4: ICD-10-CM

## 2024-12-17 DIAGNOSIS — R79.89 ELEVATED SERUM CREATININE: ICD-10-CM

## 2024-12-17 DIAGNOSIS — R97.20 ELEVATED PSA, BETWEEN 10 AND LESS THAN 20 NG/ML: Primary | ICD-10-CM

## 2024-12-17 NOTE — TELEPHONE ENCOUNTER
Patient called in stating he spoke to  yesterday and was advised to call back in if he has any questions or concerns. He asked to speak with  directly because he has questions regarding their convo from yesterday.

## 2024-12-18 ENCOUNTER — TELEPHONE (OUTPATIENT)
Age: 79
End: 2024-12-18

## 2024-12-18 RX ORDER — METOPROLOL TARTRATE 25 MG/1
TABLET, FILM COATED ORAL
Qty: 100 TABLET | Refills: 2 | Status: SHIPPED | OUTPATIENT
Start: 2024-12-18

## 2024-12-18 NOTE — TELEPHONE ENCOUNTER
I spoke with Mr. Prajapati regarding his additional questions and concerns.  He has requested to repeat PSA.  I will place the orders for [facility] and these will be mailed to his home.  He plans to complete the orders after the holiday.

## 2024-12-18 NOTE — TELEPHONE ENCOUNTER
----- Message from Dr. Annette Kim MD sent at 12/15/2024 10:18 PM EST -----  Regarding: Elevated PSA  Please clarify if this patient has seen a urologist. He needs an appointment asap due to PSA elevation. If he is not established please help him get an appointment.  ----- Message -----  From: Lillian Gtz Incoming Amb Ref Lab Orders To [facility]  Sent: 12/3/2024  11:35 PM EST  To: Annette Kim MD

## 2024-12-31 ENCOUNTER — TELEPHONE (OUTPATIENT)
Age: 79
End: 2024-12-31

## 2024-12-31 LAB
ALBUMIN SERPL-MCNC: 4.4 G/DL (ref 3.8–4.8)
ALP SERPL-CCNC: 87 IU/L (ref 44–121)
ALT SERPL-CCNC: 33 IU/L (ref 0–44)
AST SERPL-CCNC: 32 IU/L (ref 0–40)
BILIRUB SERPL-MCNC: 0.7 MG/DL (ref 0–1.2)
BUN SERPL-MCNC: 19 MG/DL (ref 8–27)
BUN/CREAT SERPL: 12 (ref 10–24)
CALCIUM SERPL-MCNC: 9.8 MG/DL (ref 8.6–10.2)
CHLORIDE SERPL-SCNC: 102 MMOL/L (ref 96–106)
CO2 SERPL-SCNC: 23 MMOL/L (ref 20–29)
CREAT SERPL-MCNC: 1.55 MG/DL (ref 0.76–1.27)
EGFRCR SERPLBLD CKD-EPI 2021: 46 ML/MIN/1.73
GLOBULIN SER CALC-MCNC: 2.2 G/DL (ref 1.5–4.5)
GLUCOSE SERPL-MCNC: 101 MG/DL (ref 70–99)
POTASSIUM SERPL-SCNC: 5.1 MMOL/L (ref 3.5–5.2)
PROT SERPL-MCNC: 6.6 G/DL (ref 6–8.5)
PSA FREE MFR SERPL: 16.3 %
PSA FREE SERPL-MCNC: 1.53 NG/ML
PSA SERPL-MCNC: 9.4 NG/ML (ref 0–4)
SODIUM SERPL-SCNC: 140 MMOL/L (ref 134–144)

## 2024-12-31 NOTE — TELEPHONE ENCOUNTER
----- Message from Dr. Annette Kim MD sent at 12/31/2024  9:51 AM EST -----  Please forward patient's lab results to the urologist that he will be seeing on January 9, 2025.  Please inform the patient that the PSA remains elevated well above normal range but has shown some mild improvement compared to the prior result.  He also has persistent renal insufficiency.  Please let him know that we are sending these results to the urologist.

## 2024-12-31 NOTE — TELEPHONE ENCOUNTER
Patient notified of results and response from Annette Kim MD    States understanding     Lab results faxed to Dr. Dash.   Lab results sent via gamigo

## 2025-01-02 NOTE — TELEPHONE ENCOUNTER
Pt would like to discuss results with Dr. Kim, I offered a VV he declined. He has concerns about the renal insuffiency.

## 2025-02-13 DIAGNOSIS — E78.2 MIXED HYPERLIPIDEMIA: ICD-10-CM

## 2025-02-16 RX ORDER — ATORVASTATIN CALCIUM 80 MG/1
TABLET, FILM COATED ORAL
Qty: 100 TABLET | Refills: 2 | Status: SHIPPED | OUTPATIENT
Start: 2025-02-16

## 2025-02-16 RX ORDER — EZETIMIBE 10 MG/1
TABLET ORAL
Qty: 100 TABLET | Refills: 2 | Status: SHIPPED | OUTPATIENT
Start: 2025-02-16

## 2025-02-21 ENCOUNTER — TELEPHONE (OUTPATIENT)
Age: 80
End: 2025-02-21

## 2025-02-21 NOTE — TELEPHONE ENCOUNTER
Spoke to patient and he wanted to notify  that he has been diagnosed with prostate cancer and he will have a biopsy completed on 3/5 with his urologist

## 2025-02-21 NOTE — TELEPHONE ENCOUNTER
States would like to talk to dr reardon    States regarding urology referral update    States only wants to talk to dr reardon

## 2025-03-18 ENCOUNTER — TELEPHONE (OUTPATIENT)
Age: 80
End: 2025-03-18

## 2025-03-20 DIAGNOSIS — I25.10 CORONARY ARTERY DISEASE INVOLVING NATIVE CORONARY ARTERY OF NATIVE HEART WITHOUT ANGINA PECTORIS: ICD-10-CM

## 2025-03-20 DIAGNOSIS — E78.2 MIXED HYPERLIPIDEMIA: ICD-10-CM

## 2025-03-25 RX ORDER — AMLODIPINE BESYLATE 2.5 MG/1
2.5 TABLET ORAL DAILY
Qty: 100 TABLET | Refills: 2 | Status: SHIPPED | OUTPATIENT
Start: 2025-03-25

## 2025-03-28 ENCOUNTER — TELEPHONE (OUTPATIENT)
Age: 80
End: 2025-03-28

## 2025-03-28 NOTE — TELEPHONE ENCOUNTER
Pt states that he made a VV on My Chart for 4-7-25 @ 2 pm.    I don't see it on his chart, but he states it went through on his end.     Can you check into this for me?  Thanks.

## 2025-04-04 SDOH — ECONOMIC STABILITY: FOOD INSECURITY: WITHIN THE PAST 12 MONTHS, YOU WORRIED THAT YOUR FOOD WOULD RUN OUT BEFORE YOU GOT MONEY TO BUY MORE.: NEVER TRUE

## 2025-04-04 SDOH — ECONOMIC STABILITY: FOOD INSECURITY: WITHIN THE PAST 12 MONTHS, THE FOOD YOU BOUGHT JUST DIDN'T LAST AND YOU DIDN'T HAVE MONEY TO GET MORE.: NEVER TRUE

## 2025-04-04 SDOH — ECONOMIC STABILITY: TRANSPORTATION INSECURITY
IN THE PAST 12 MONTHS, HAS THE LACK OF TRANSPORTATION KEPT YOU FROM MEDICAL APPOINTMENTS OR FROM GETTING MEDICATIONS?: NO

## 2025-04-04 SDOH — ECONOMIC STABILITY: INCOME INSECURITY: IN THE LAST 12 MONTHS, WAS THERE A TIME WHEN YOU WERE NOT ABLE TO PAY THE MORTGAGE OR RENT ON TIME?: NO

## 2025-04-07 ENCOUNTER — TELEMEDICINE (OUTPATIENT)
Age: 80
End: 2025-04-07

## 2025-04-07 DIAGNOSIS — C61 PROSTATE CANCER (HCC): ICD-10-CM

## 2025-04-07 DIAGNOSIS — F41.9 ANXIETY DISORDER, UNSPECIFIED TYPE: Primary | ICD-10-CM

## 2025-04-07 ASSESSMENT — PATIENT HEALTH QUESTIONNAIRE - PHQ9
1. LITTLE INTEREST OR PLEASURE IN DOING THINGS: NOT AT ALL
3. TROUBLE FALLING OR STAYING ASLEEP: NOT AT ALL
SUM OF ALL RESPONSES TO PHQ QUESTIONS 1-9: 0
SUM OF ALL RESPONSES TO PHQ QUESTIONS 1-9: 0
9. THOUGHTS THAT YOU WOULD BE BETTER OFF DEAD, OR OF HURTING YOURSELF: NOT AT ALL
5. POOR APPETITE OR OVEREATING: NOT AT ALL
4. FEELING TIRED OR HAVING LITTLE ENERGY: NOT AT ALL
SUM OF ALL RESPONSES TO PHQ QUESTIONS 1-9: 0
6. FEELING BAD ABOUT YOURSELF - OR THAT YOU ARE A FAILURE OR HAVE LET YOURSELF OR YOUR FAMILY DOWN: NOT AT ALL
10. IF YOU CHECKED OFF ANY PROBLEMS, HOW DIFFICULT HAVE THESE PROBLEMS MADE IT FOR YOU TO DO YOUR WORK, TAKE CARE OF THINGS AT HOME, OR GET ALONG WITH OTHER PEOPLE: NOT DIFFICULT AT ALL
SUM OF ALL RESPONSES TO PHQ QUESTIONS 1-9: 0
2. FEELING DOWN, DEPRESSED OR HOPELESS: NOT AT ALL
8. MOVING OR SPEAKING SO SLOWLY THAT OTHER PEOPLE COULD HAVE NOTICED. OR THE OPPOSITE, BEING SO FIGETY OR RESTLESS THAT YOU HAVE BEEN MOVING AROUND A LOT MORE THAN USUAL: NOT AT ALL
7. TROUBLE CONCENTRATING ON THINGS, SUCH AS READING THE NEWSPAPER OR WATCHING TELEVISION: NOT AT ALL

## 2025-04-07 NOTE — PROGRESS NOTES
\"Have you been to the ER, urgent care clinic since your last visit?  Hospitalized since your last visit?\"    NO    “Have you seen or consulted any other health care providers outside our system since your last visit?”    NO           
consent. Patient identification was verified, and a caregiver was present when appropriate.   The patient was located at Home: [address] [address]  Provider was located at [facility] [address]  [place] [address]  Confirm you are appropriately licensed, registered, or certified to deliver care in the state where the patient is located as indicated above. If you are not or unsure, please re-schedule the visit: Yes, I confirm.       Total time spent on this encounter: Not billed by time    --Annette Kim MD on 4/10/2025 at 7:04 PM    An electronic signature was used to authenticate this note.

## 2025-04-24 DIAGNOSIS — F32.A DEPRESSION, UNSPECIFIED DEPRESSION TYPE: ICD-10-CM

## 2025-04-24 DIAGNOSIS — F41.9 ANXIETY DISORDER, UNSPECIFIED TYPE: ICD-10-CM

## 2025-04-25 RX ORDER — BUPROPION HYDROCHLORIDE 150 MG/1
150 TABLET ORAL EVERY MORNING
Qty: 90 TABLET | Refills: 3 | Status: SHIPPED | OUTPATIENT
Start: 2025-04-25

## 2025-04-29 DIAGNOSIS — F41.9 ANXIETY DISORDER, UNSPECIFIED TYPE: ICD-10-CM

## 2025-04-29 NOTE — TELEPHONE ENCOUNTER
Future Appointments:  Future Appointments   Date Time Provider Department Center   12/3/2025  1:20 PM Annette Kim MD [facility] DE[facility]        Last Appointment With Me:  4/7/2025     Requested Prescriptions     Pending Prescriptions Disp Refills    busPIRone (BUSPAR) 7.5 MG tablet       Sig: Take 1 tablet by mouth 3 times daily

## 2025-04-29 NOTE — TELEPHONE ENCOUNTER
Caller requests Refill of:  busPIRone (BUSPAR) 7.5 MG tablet     Pt states takes medication 3 times a day and would like a quantity on 300.     Please send to:    [facility]) - [place], [place] - [address] [facility] -  [phone] - F [phone] [address]  [facility] [address]  Phone: [phone] Fax: [phone]    Visit / Appointment History:  Future Appointment at [facility]:  Visit date not found   Last Appointment With PCP:  4/7/2025       Caller confirmed instructions and dosages as correct.    Caller was advised that Meds will be refilled as soon as possible, however there can be a 48-72 business hour turn around on refill requests.

## 2025-05-02 RX ORDER — BUSPIRONE HYDROCHLORIDE 7.5 MG/1
7.5 TABLET ORAL 3 TIMES DAILY
Qty: 270 TABLET | Refills: 3 | Status: SHIPPED | OUTPATIENT
Start: 2025-05-02

## 2025-08-18 ENCOUNTER — TELEPHONE (OUTPATIENT)
Age: 80
End: 2025-08-18

## (undated) DEVICE — TROCAR ENDOSCP L100MM DIA10MM STRUCTURAL BLLN FOR LAP

## (undated) DEVICE — DERMABOND SKIN ADH 0.7ML -- DERMABOND ADVANCED 12/BX

## (undated) DEVICE — COVER LT HNDL PLAS RIG 1 PER PK

## (undated) DEVICE — TROCAR: Brand: KII® SLEEVE

## (undated) DEVICE — SUTURE VCRL SZ 4-0 L27IN ABSRB UD L19MM PS-2 3/8 CIR PRIM J426H

## (undated) DEVICE — TOWEL SURG W17XL27IN STD BLU COT NONFENESTRATED PREWASHED

## (undated) DEVICE — STERILE POLYISOPRENE POWDER-FREE SURGICAL GLOVES: Brand: PROTEXIS

## (undated) DEVICE — DISSECTOR ENDOSCP PREPERI KID SHP DISTENSION BLLN SPCMKR

## (undated) DEVICE — PREP SKN CHLRAPRP APL 26ML STR --

## (undated) DEVICE — REM POLYHESIVE ADULT PATIENT RETURN ELECTRODE: Brand: VALLEYLAB

## (undated) DEVICE — INFECTION CONTROL KIT SYS

## (undated) DEVICE — SURGICAL PROCEDURE KIT GEN LAPAROSCOPY LF

## (undated) DEVICE — SOLUTION IV 1000ML 0.9% SOD CHL

## (undated) DEVICE — TROCAR: Brand: KII FIOS FIRST ENTRY

## (undated) DEVICE — SUTURE SZ 0 27IN 5/8 CIR UR-6  TAPER PT VIOLET ABSRB VICRYL J603H

## (undated) DEVICE — NEEDLE HYPO 25GA L1.5IN BVL ORIENTED ECLIPSE